# Patient Record
Sex: FEMALE | Race: WHITE | NOT HISPANIC OR LATINO | Employment: OTHER | URBAN - METROPOLITAN AREA
[De-identification: names, ages, dates, MRNs, and addresses within clinical notes are randomized per-mention and may not be internally consistent; named-entity substitution may affect disease eponyms.]

---

## 2018-01-18 ENCOUNTER — RECORDS - HEALTHEAST (OUTPATIENT)
Dept: LAB | Facility: CLINIC | Age: 83
End: 2018-01-18

## 2018-01-18 LAB
ANION GAP SERPL CALCULATED.3IONS-SCNC: 8 MMOL/L (ref 5–18)
BUN SERPL-MCNC: 19 MG/DL (ref 8–28)
CALCIUM SERPL-MCNC: 9 MG/DL (ref 8.5–10.5)
CHLORIDE BLD-SCNC: 100 MMOL/L (ref 98–107)
CO2 SERPL-SCNC: 28 MMOL/L (ref 22–31)
CREAT SERPL-MCNC: 0.65 MG/DL (ref 0.6–1.1)
GFR SERPL CREATININE-BSD FRML MDRD: >60 ML/MIN/1.73M2
GLUCOSE BLD-MCNC: 88 MG/DL (ref 70–125)
POTASSIUM BLD-SCNC: 4 MMOL/L (ref 3.5–5)
SODIUM SERPL-SCNC: 136 MMOL/L (ref 136–145)

## 2018-03-20 ENCOUNTER — RECORDS - HEALTHEAST (OUTPATIENT)
Dept: ADMINISTRATIVE | Facility: OTHER | Age: 83
End: 2018-03-20

## 2018-03-20 ENCOUNTER — HOSPITAL ENCOUNTER (OUTPATIENT)
Dept: CT IMAGING | Facility: HOSPITAL | Age: 83
Discharge: HOME | End: 2018-03-20
Attending: FAMILY MEDICINE

## 2018-03-20 DIAGNOSIS — R51.9 RIGHT SIDED FACIAL PAIN: ICD-10-CM

## 2018-05-24 ENCOUNTER — COMMUNICATION - HEALTHEAST (OUTPATIENT)
Dept: PULMONOLOGY | Facility: OTHER | Age: 83
End: 2018-05-24

## 2018-05-25 ENCOUNTER — COMMUNICATION - HEALTHEAST (OUTPATIENT)
Dept: PULMONOLOGY | Facility: OTHER | Age: 83
End: 2018-05-25

## 2018-06-04 ENCOUNTER — RECORDS - HEALTHEAST (OUTPATIENT)
Dept: ADMINISTRATIVE | Facility: OTHER | Age: 83
End: 2018-06-04

## 2018-08-07 ENCOUNTER — RECORDS - HEALTHEAST (OUTPATIENT)
Dept: LAB | Facility: CLINIC | Age: 83
End: 2018-08-07

## 2018-08-07 LAB
ANION GAP SERPL CALCULATED.3IONS-SCNC: 9 MMOL/L (ref 5–18)
BUN SERPL-MCNC: 14 MG/DL (ref 8–28)
CALCIUM SERPL-MCNC: 8.6 MG/DL (ref 8.5–10.5)
CHLORIDE BLD-SCNC: 98 MMOL/L (ref 98–107)
CO2 SERPL-SCNC: 27 MMOL/L (ref 22–31)
CREAT SERPL-MCNC: 0.77 MG/DL (ref 0.6–1.1)
GFR SERPL CREATININE-BSD FRML MDRD: >60 ML/MIN/1.73M2
GLUCOSE BLD-MCNC: 76 MG/DL (ref 70–125)
POTASSIUM BLD-SCNC: 4.5 MMOL/L (ref 3.5–5)
SODIUM SERPL-SCNC: 134 MMOL/L (ref 136–145)
TSH SERPL DL<=0.005 MIU/L-ACNC: 3.54 UIU/ML (ref 0.3–5)

## 2018-10-03 ENCOUNTER — RECORDS - HEALTHEAST (OUTPATIENT)
Dept: ADMINISTRATIVE | Facility: OTHER | Age: 83
End: 2018-10-03

## 2018-10-09 ENCOUNTER — HOSPITAL ENCOUNTER (OUTPATIENT)
Dept: INTERVENTIONAL RADIOLOGY/VASCULAR | Facility: HOSPITAL | Age: 83
Discharge: HOME | End: 2018-10-09
Attending: INTERNAL MEDICINE | Admitting: RADIOLOGY

## 2018-10-09 DIAGNOSIS — C34.90 NON-SMALL CELL LUNG CANCER (H): ICD-10-CM

## 2018-10-09 LAB
HGB BLD-MCNC: 12.4 G/DL (ref 12–16)
INR PPP: 1.05 (ref 0.9–1.1)
PLATELET # BLD AUTO: 203 THOU/UL (ref 140–440)

## 2018-12-13 ENCOUNTER — RECORDS - HEALTHEAST (OUTPATIENT)
Dept: LAB | Facility: CLINIC | Age: 83
End: 2018-12-13

## 2018-12-13 LAB
ERYTHROCYTE [DISTWIDTH] IN BLOOD BY AUTOMATED COUNT: 18.3 % (ref 11–14.5)
HCT VFR BLD AUTO: 30.3 % (ref 35–47)
HGB BLD-MCNC: 9.9 G/DL (ref 12–16)
MCH RBC QN AUTO: 32.9 PG (ref 27–34)
MCHC RBC AUTO-ENTMCNC: 32.7 G/DL (ref 32–36)
MCV RBC AUTO: 101 FL (ref 80–100)
PLATELET # BLD AUTO: 207 THOU/UL (ref 140–440)
PMV BLD AUTO: 10 FL (ref 8.5–12.5)
RBC # BLD AUTO: 3.01 MILL/UL (ref 3.8–5.4)
WBC: 2.5 THOU/UL (ref 4–11)

## 2019-07-31 ENCOUNTER — RECORDS - HEALTHEAST (OUTPATIENT)
Dept: LAB | Facility: CLINIC | Age: 84
End: 2019-07-31

## 2019-07-31 LAB
ANION GAP SERPL CALCULATED.3IONS-SCNC: 8 MMOL/L (ref 5–18)
BUN SERPL-MCNC: 17 MG/DL (ref 8–28)
CALCIUM SERPL-MCNC: 9.5 MG/DL (ref 8.5–10.5)
CHLORIDE BLD-SCNC: 100 MMOL/L (ref 98–107)
CO2 SERPL-SCNC: 30 MMOL/L (ref 22–31)
CREAT SERPL-MCNC: 0.7 MG/DL (ref 0.6–1.1)
GFR SERPL CREATININE-BSD FRML MDRD: >60 ML/MIN/1.73M2
GLUCOSE BLD-MCNC: 99 MG/DL (ref 70–125)
POTASSIUM BLD-SCNC: 4.3 MMOL/L (ref 3.5–5)
SODIUM SERPL-SCNC: 138 MMOL/L (ref 136–145)
TSH SERPL DL<=0.005 MIU/L-ACNC: 0.91 UIU/ML (ref 0.3–5)

## 2020-02-19 ENCOUNTER — RECORDS - HEALTHEAST (OUTPATIENT)
Dept: LAB | Facility: CLINIC | Age: 85
End: 2020-02-19

## 2020-02-19 LAB
ANION GAP SERPL CALCULATED.3IONS-SCNC: 10 MMOL/L (ref 5–18)
BUN SERPL-MCNC: 19 MG/DL (ref 8–28)
CALCIUM SERPL-MCNC: 10 MG/DL (ref 8.5–10.5)
CHLORIDE BLD-SCNC: 97 MMOL/L (ref 98–107)
CO2 SERPL-SCNC: 30 MMOL/L (ref 22–31)
CREAT SERPL-MCNC: 0.66 MG/DL (ref 0.6–1.1)
GFR SERPL CREATININE-BSD FRML MDRD: >60 ML/MIN/1.73M2
GLUCOSE BLD-MCNC: 63 MG/DL (ref 70–125)
POTASSIUM BLD-SCNC: 4.5 MMOL/L (ref 3.5–5)
SODIUM SERPL-SCNC: 137 MMOL/L (ref 136–145)

## 2020-08-25 ENCOUNTER — RECORDS - HEALTHEAST (OUTPATIENT)
Dept: LAB | Facility: CLINIC | Age: 85
End: 2020-08-25

## 2020-08-25 LAB
ANION GAP SERPL CALCULATED.3IONS-SCNC: 13 MMOL/L (ref 5–18)
BUN SERPL-MCNC: 14 MG/DL (ref 8–28)
CALCIUM SERPL-MCNC: 8.3 MG/DL (ref 8.5–10.5)
CHLORIDE BLD-SCNC: 100 MMOL/L (ref 98–107)
CO2 SERPL-SCNC: 23 MMOL/L (ref 22–31)
CREAT SERPL-MCNC: 0.75 MG/DL (ref 0.6–1.1)
GFR SERPL CREATININE-BSD FRML MDRD: >60 ML/MIN/1.73M2
GLUCOSE BLD-MCNC: 89 MG/DL (ref 70–125)
POTASSIUM BLD-SCNC: 4.3 MMOL/L (ref 3.5–5)
SODIUM SERPL-SCNC: 136 MMOL/L (ref 136–145)
TSH SERPL DL<=0.005 MIU/L-ACNC: 0.87 UIU/ML (ref 0.3–5)

## 2020-09-24 ENCOUNTER — RECORDS - HEALTHEAST (OUTPATIENT)
Dept: LAB | Facility: CLINIC | Age: 85
End: 2020-09-24

## 2020-09-24 LAB
ALBUMIN SERPL-MCNC: 3.6 G/DL (ref 3.5–5)
ALP SERPL-CCNC: 51 U/L (ref 45–120)
ALT SERPL W P-5'-P-CCNC: 21 U/L (ref 0–45)
ANION GAP SERPL CALCULATED.3IONS-SCNC: 9 MMOL/L (ref 5–18)
AST SERPL W P-5'-P-CCNC: 26 U/L (ref 0–40)
BASOPHILS # BLD AUTO: 0 THOU/UL (ref 0–0.2)
BASOPHILS NFR BLD AUTO: 0 % (ref 0–2)
BILIRUB SERPL-MCNC: 0.7 MG/DL (ref 0–1)
BUN SERPL-MCNC: 18 MG/DL (ref 8–28)
CALCIUM SERPL-MCNC: 8.8 MG/DL (ref 8.5–10.5)
CHLORIDE BLD-SCNC: 102 MMOL/L (ref 98–107)
CO2 SERPL-SCNC: 25 MMOL/L (ref 22–31)
CREAT SERPL-MCNC: 0.72 MG/DL (ref 0.6–1.1)
EOSINOPHIL COUNT (ABSOLUTE): 0 THOU/UL (ref 0–0.4)
EOSINOPHIL NFR BLD AUTO: 2 % (ref 0–6)
ERYTHROCYTE [DISTWIDTH] IN BLOOD BY AUTOMATED COUNT: 16.2 % (ref 11–14.5)
GFR SERPL CREATININE-BSD FRML MDRD: >60 ML/MIN/1.73M2
GLUCOSE BLD-MCNC: 81 MG/DL (ref 70–125)
HCT VFR BLD AUTO: 38.1 % (ref 35–47)
HGB BLD-MCNC: 12.5 G/DL (ref 12–16)
IMMATURE GRANULOCYTE % - MAN (DIFF): 0 %
IMMATURE GRANULOCYTE ABSOLUTE - MAN (DIFF): 0 THOU/UL
LYMPHOCYTES # BLD AUTO: 0.5 THOU/UL (ref 0.8–4.4)
LYMPHOCYTES NFR BLD AUTO: 22 % (ref 20–40)
MANUAL NRBC PER 100 CELLS: 1
MCH RBC QN AUTO: 32.5 PG (ref 27–34)
MCHC RBC AUTO-ENTMCNC: 32.8 G/DL (ref 32–36)
MCV RBC AUTO: 99 FL (ref 80–100)
MONOCYTES # BLD AUTO: 0.4 THOU/UL (ref 0–0.9)
MONOCYTES NFR BLD AUTO: 17 % (ref 2–10)
OVALOCYTES: ABNORMAL
PLAT MORPH BLD: NORMAL
PLATELET # BLD AUTO: 181 THOU/UL (ref 140–440)
PMV BLD AUTO: 11.5 FL (ref 8.5–12.5)
POTASSIUM BLD-SCNC: 4.5 MMOL/L (ref 3.5–5)
PROT SERPL-MCNC: 6.9 G/DL (ref 6–8)
RBC # BLD AUTO: 3.85 MILL/UL (ref 3.8–5.4)
SODIUM SERPL-SCNC: 136 MMOL/L (ref 136–145)
TOTAL NEUTROPHILS-ABS(DIFF): 1.4 THOU/UL (ref 2–7.7)
TOTAL NEUTROPHILS-REL(DIFF): 59 % (ref 50–70)
WBC: 2.3 THOU/UL (ref 4–11)

## 2020-12-18 ENCOUNTER — AMBULATORY - HEALTHEAST (OUTPATIENT)
Dept: SCHEDULING | Facility: CLINIC | Age: 85
End: 2020-12-18

## 2020-12-18 DIAGNOSIS — C34.90 NON-SMALL CELL LUNG CANCER (H): ICD-10-CM

## 2020-12-31 ENCOUNTER — AMBULATORY - HEALTHEAST (OUTPATIENT)
Dept: ULTRASOUND IMAGING | Facility: HOSPITAL | Age: 85
End: 2020-12-31

## 2020-12-31 DIAGNOSIS — Z11.59 ENCOUNTER FOR SCREENING FOR OTHER VIRAL DISEASES: ICD-10-CM

## 2021-01-01 ENCOUNTER — HOSPITAL ENCOUNTER (OUTPATIENT)
Dept: RADIOLOGY | Facility: HOSPITAL | Age: 86
End: 2021-11-26
Attending: INTERNAL MEDICINE
Payer: MEDICARE

## 2021-01-01 ENCOUNTER — LAB (OUTPATIENT)
Dept: LAB | Facility: CLINIC | Age: 86
End: 2021-01-01
Payer: MEDICARE

## 2021-01-01 ENCOUNTER — HOSPITAL ENCOUNTER (OUTPATIENT)
Dept: ULTRASOUND IMAGING | Facility: HOSPITAL | Age: 86
End: 2021-11-26
Attending: INTERNAL MEDICINE
Payer: MEDICARE

## 2021-01-01 DIAGNOSIS — C34.90 NON-SMALL CELL LUNG CANCER, UNSPECIFIED LATERALITY (H): ICD-10-CM

## 2021-01-01 DIAGNOSIS — Z11.59 ENCOUNTER FOR SCREENING FOR OTHER VIRAL DISEASES: ICD-10-CM

## 2021-01-01 LAB — SARS-COV-2 RNA RESP QL NAA+PROBE: NEGATIVE

## 2021-01-01 PROCEDURE — U0005 INFEC AGEN DETEC AMPLI PROBE: HCPCS

## 2021-01-01 PROCEDURE — 76604 US EXAM CHEST: CPT

## 2021-01-01 PROCEDURE — 71046 X-RAY EXAM CHEST 2 VIEWS: CPT

## 2021-01-01 PROCEDURE — U0003 INFECTIOUS AGENT DETECTION BY NUCLEIC ACID (DNA OR RNA); SEVERE ACUTE RESPIRATORY SYNDROME CORONAVIRUS 2 (SARS-COV-2) (CORONAVIRUS DISEASE [COVID-19]), AMPLIFIED PROBE TECHNIQUE, MAKING USE OF HIGH THROUGHPUT TECHNOLOGIES AS DESCRIBED BY CMS-2020-01-R: HCPCS

## 2021-01-14 ENCOUNTER — AMBULATORY - HEALTHEAST (OUTPATIENT)
Dept: ULTRASOUND IMAGING | Facility: HOSPITAL | Age: 86
End: 2021-01-14

## 2021-01-14 DIAGNOSIS — Z11.59 ENCOUNTER FOR SCREENING FOR OTHER VIRAL DISEASES: ICD-10-CM

## 2021-03-10 ENCOUNTER — AMBULATORY - HEALTHEAST (OUTPATIENT)
Dept: ULTRASOUND IMAGING | Facility: HOSPITAL | Age: 86
End: 2021-03-10

## 2021-03-10 DIAGNOSIS — Z11.59 ENCOUNTER FOR SCREENING FOR OTHER VIRAL DISEASES: ICD-10-CM

## 2021-03-12 ENCOUNTER — AMBULATORY - HEALTHEAST (OUTPATIENT)
Dept: LAB | Facility: CLINIC | Age: 86
End: 2021-03-12

## 2021-03-12 DIAGNOSIS — Z11.59 ENCOUNTER FOR SCREENING FOR OTHER VIRAL DISEASES: ICD-10-CM

## 2021-03-13 LAB
SARS-COV-2 PCR COMMENT: NORMAL
SARS-COV-2 RNA SPEC QL NAA+PROBE: NEGATIVE
SARS-COV-2 VIRUS SPECIMEN SOURCE: NORMAL

## 2021-03-14 ENCOUNTER — COMMUNICATION - HEALTHEAST (OUTPATIENT)
Dept: SCHEDULING | Facility: CLINIC | Age: 86
End: 2021-03-14

## 2021-03-16 ENCOUNTER — HOSPITAL ENCOUNTER (OUTPATIENT)
Dept: ULTRASOUND IMAGING | Facility: HOSPITAL | Age: 86
Discharge: HOME | End: 2021-03-16
Attending: INTERNAL MEDICINE | Admitting: RADIOLOGY

## 2021-03-16 DIAGNOSIS — C34.90 NON-SMALL CELL LUNG CANCER (H): ICD-10-CM

## 2021-04-08 ENCOUNTER — AMBULATORY - HEALTHEAST (OUTPATIENT)
Dept: FAMILY MEDICINE | Facility: CLINIC | Age: 86
End: 2021-04-08

## 2021-04-08 DIAGNOSIS — Z11.59 ENCOUNTER FOR SCREENING FOR OTHER VIRAL DISEASES: ICD-10-CM

## 2021-04-10 ENCOUNTER — COMMUNICATION - HEALTHEAST (OUTPATIENT)
Dept: SCHEDULING | Facility: CLINIC | Age: 86
End: 2021-04-10

## 2021-04-19 ENCOUNTER — AMBULATORY - HEALTHEAST (OUTPATIENT)
Dept: INTERVENTIONAL RADIOLOGY/VASCULAR | Facility: HOSPITAL | Age: 86
End: 2021-04-19

## 2021-04-19 DIAGNOSIS — Z11.59 ENCOUNTER FOR SCREENING FOR OTHER VIRAL DISEASES: ICD-10-CM

## 2021-04-23 ENCOUNTER — AMBULATORY - HEALTHEAST (OUTPATIENT)
Dept: LAB | Facility: CLINIC | Age: 86
End: 2021-04-23

## 2021-04-23 DIAGNOSIS — Z11.59 ENCOUNTER FOR SCREENING FOR OTHER VIRAL DISEASES: ICD-10-CM

## 2021-04-25 ENCOUNTER — COMMUNICATION - HEALTHEAST (OUTPATIENT)
Dept: SCHEDULING | Facility: CLINIC | Age: 86
End: 2021-04-25

## 2021-04-27 ENCOUNTER — HOSPITAL ENCOUNTER (OUTPATIENT)
Dept: RADIOLOGY | Facility: HOSPITAL | Age: 86
Discharge: HOME | End: 2021-04-27
Attending: RADIOLOGY

## 2021-04-27 ENCOUNTER — HOSPITAL ENCOUNTER (OUTPATIENT)
Dept: ULTRASOUND IMAGING | Facility: HOSPITAL | Age: 86
Discharge: HOME | End: 2021-04-27
Attending: INTERNAL MEDICINE

## 2021-04-27 DIAGNOSIS — C34.90 NON-SMALL CELL LUNG CANCER (H): ICD-10-CM

## 2021-05-24 ENCOUNTER — RECORDS - HEALTHEAST (OUTPATIENT)
Dept: ADMINISTRATIVE | Facility: CLINIC | Age: 86
End: 2021-05-24

## 2021-05-25 ENCOUNTER — RECORDS - HEALTHEAST (OUTPATIENT)
Dept: ADMINISTRATIVE | Facility: CLINIC | Age: 86
End: 2021-05-25

## 2021-05-26 ENCOUNTER — RECORDS - HEALTHEAST (OUTPATIENT)
Dept: ADMINISTRATIVE | Facility: CLINIC | Age: 86
End: 2021-05-26

## 2021-05-27 ENCOUNTER — RECORDS - HEALTHEAST (OUTPATIENT)
Dept: ADMINISTRATIVE | Facility: CLINIC | Age: 86
End: 2021-05-27

## 2021-05-28 ENCOUNTER — RECORDS - HEALTHEAST (OUTPATIENT)
Dept: ADMINISTRATIVE | Facility: CLINIC | Age: 86
End: 2021-05-28

## 2021-06-02 VITALS — WEIGHT: 109 LBS | BODY MASS INDEX: 19.94 KG/M2

## 2021-06-11 ENCOUNTER — AMBULATORY - HEALTHEAST (OUTPATIENT)
Dept: ULTRASOUND IMAGING | Facility: HOSPITAL | Age: 86
End: 2021-06-11

## 2021-06-11 DIAGNOSIS — Z11.59 ENCOUNTER FOR SCREENING FOR OTHER VIRAL DISEASES: ICD-10-CM

## 2021-06-14 ENCOUNTER — AMBULATORY - HEALTHEAST (OUTPATIENT)
Dept: LAB | Facility: CLINIC | Age: 86
End: 2021-06-14

## 2021-06-14 DIAGNOSIS — Z11.59 ENCOUNTER FOR SCREENING FOR OTHER VIRAL DISEASES: ICD-10-CM

## 2021-06-16 ENCOUNTER — RECORDS - HEALTHEAST (OUTPATIENT)
Dept: SCHEDULING | Facility: CLINIC | Age: 86
End: 2021-06-16

## 2021-06-16 ENCOUNTER — HOSPITAL ENCOUNTER (OUTPATIENT)
Dept: ULTRASOUND IMAGING | Facility: HOSPITAL | Age: 86
Discharge: HOME | End: 2021-06-16
Attending: INTERNAL MEDICINE
Payer: MEDICARE

## 2021-06-16 ENCOUNTER — COMMUNICATION - HEALTHEAST (OUTPATIENT)
Dept: SCHEDULING | Facility: CLINIC | Age: 86
End: 2021-06-16

## 2021-06-16 DIAGNOSIS — C34.90 NON-SMALL CELL LUNG CANCER (H): ICD-10-CM

## 2021-06-16 PROBLEM — D75.89 BICYTOPENIA: Status: ACTIVE | Noted: 2021-04-08

## 2021-06-16 PROBLEM — D70.1 CHEMOTHERAPY-INDUCED NEUTROPENIA (H): Status: ACTIVE | Noted: 2018-11-30

## 2021-06-16 PROBLEM — R91.8 MULTIPLE PULMONARY NODULES: Status: ACTIVE | Noted: 2018-05-31

## 2021-06-16 PROBLEM — G47.00 INSOMNIA: Status: ACTIVE | Noted: 2021-01-12

## 2021-06-16 PROBLEM — E03.9 HYPOTHYROIDISM: Status: ACTIVE | Noted: 2021-01-12

## 2021-06-16 PROBLEM — J30.1 ALLERGIC RHINITIS DUE TO POLLEN: Status: ACTIVE | Noted: 2021-01-12

## 2021-06-16 PROBLEM — R79.89 ELEVATED BRAIN NATRIURETIC PEPTIDE (BNP) LEVEL: Status: ACTIVE | Noted: 2021-04-08

## 2021-06-16 PROBLEM — R55 SYNCOPE AND COLLAPSE: Status: ACTIVE | Noted: 2018-05-22

## 2021-06-16 PROBLEM — I11.9 HYPERTENSIVE HEART DISEASE WITHOUT HEART FAILURE: Status: ACTIVE | Noted: 2018-05-31

## 2021-06-16 PROBLEM — R53.1 WEAKNESS: Status: ACTIVE | Noted: 2021-04-08

## 2021-06-16 PROBLEM — J96.01 ACUTE RESPIRATORY FAILURE WITH HYPOXIA (H): Status: ACTIVE | Noted: 2021-04-08

## 2021-06-16 PROBLEM — D63.0 ANEMIA IN NEOPLASTIC DISEASE: Status: ACTIVE | Noted: 2018-11-30

## 2021-06-16 PROBLEM — M81.0 OSTEOPOROSIS: Status: ACTIVE | Noted: 2018-05-31

## 2021-06-16 PROBLEM — R32 URINARY INCONTINENCE: Status: ACTIVE | Noted: 2021-01-12

## 2021-06-16 PROBLEM — J31.0 CHRONIC RHINITIS: Status: ACTIVE | Noted: 2021-01-12

## 2021-06-16 PROBLEM — R40.20 LOSS OF CONSCIOUSNESS (H): Status: ACTIVE | Noted: 2018-05-22

## 2021-06-16 PROBLEM — J90 PLEURAL EFFUSION: Status: ACTIVE | Noted: 2021-04-08

## 2021-06-16 PROBLEM — T45.1X5A CHEMOTHERAPY-INDUCED NEUTROPENIA (H): Status: ACTIVE | Noted: 2018-11-30

## 2021-06-16 PROBLEM — Z86.79 HISTORY OF CARDIOVASCULAR DISORDER: Status: ACTIVE | Noted: 2018-05-31

## 2021-06-16 PROBLEM — R60.0 LOWER EXTREMITY EDEMA: Status: ACTIVE | Noted: 2021-04-08

## 2021-06-16 PROBLEM — F43.29 ADJUSTMENT DISORDER WITH MIXED EMOTIONAL FEATURES: Status: ACTIVE | Noted: 2021-01-12

## 2021-06-16 PROBLEM — J32.9 CHRONIC SINUSITIS: Status: ACTIVE | Noted: 2018-05-31

## 2021-06-20 NOTE — H&P
[facility] Radiology History and Physical Note  Date/Time: 10/9/2018/7:53 AM    Procedure Requested: Port placement  Requesting Provider: Dr. Farias    HPI: Jennifer Rashid is a 86 y.o. female with history of HTN, thyroid cancer s/p partial thyroidectomy and non small cell lung cancer (RUL) s/p chemo x 12 weeks with good response. Plan is to continue concurrent chemotherapy and radiation. Patient presents today for port placement.     Discussed LEFT port placement d/t plan for RUL radiation.     NPO Status: [place]  Anticoagulation/Antiplatelets/Bleeding tendencies: Aspirin  Antibiotics: Clindamycin for procedure    REVIEW OF SYMPTOMS: Denies recent fevers, chills, night sweats, abdominal pain, N/V/D.    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       aspirin 81 MG EC tablet Take 81 mg by mouth daily.       azelastine (ASTELIN) 137 mcg (0.1 %) nasal spray Apply 2 sprays into each nostril every morning. Use in each nostril as directed       calcium-vitamin D 500 mg(1,250mg) -200 unit per tablet Take 1 tablet by mouth 2 (two) times a day with meals.       cholecalciferol, vitamin D3, (VITAMIN D3) 2,000 unit Tab Take 1 tablet by mouth daily.       cyanocobalamin (VITAMIN B-12) 500 MCG tablet Take 500 mcg by mouth daily.       denosumab 60 mg/mL Syrg Inject 60 mg under the skin every 6 (six) months.       levothyroxine (SYNTHROID, LEVOTHROID) 75 MCG tablet Take 75 mcg by mouth daily.       lisinopril (PRINIVIL,ZESTRIL) 20 MG tablet Take 20 mg by mouth daily.        metoprolol succinate (TOPROL-XL) 100 MG 24 hr tablet Take 100 mg by mouth daily.       multivitamin therapeutic tablet Take 1 tablet by mouth daily.       Current Facility-Administered Medications   Medication Dose Route Frequency Provider Last Rate Last Dose     clindamycin in  mg/50 mL IVPB 900 mg (CLEOCIN)  900 mg Intravenous 30 Min Pre-Op Sandra Marrero CNP         sodium chloride 0.9%  125 mL/hr Intravenous Continuous Sandra Marrero CNP         sodium chloride bacteriostatic 0.9 % injection 0.1-0.3 mL  0.1-0.3 mL Subcutaneous PRN Sandra Marrero CNP         sodium chloride flush 3 mL (NS)  3 mL Intravenous Line Care Sandra Marrero CNP           LABS:  INR (no units)   Date Value   10/09/2018 1.05     Hemoglobin (g/dL)   Date Value   10/09/2018 12.4     Platelets (thou/uL)   Date Value   10/09/2018 203     EXAM:  /75  Pulse 67  Temp 97.7  F (36.5  C) (Oral)   Resp 15  Wt 109 lb (49.4 kg)  SpO2 100%  BMI 19.94 kg/m2  General: Stable. In no acute distress.  Neuro: A&O x 3. y.  Resp: Lungs clear to auscultation bilaterally.  Cardio: S1S2 and reg, without murmur, clicks or rubs  Skin: Without excoriations, ecchymosis, erythema, lesions or open sores on bilateral chest/neck.    Pre-Sedation Assessment:  Mallampati Airway Classification: Class 2: upper half of tonsil fossa visible  Previous reaction to anesthesia/sedation: no  Sedation plan based on assessment: Moderate  Sleep Apnea: no  Dentures: no  COPD: no  ASA Classification: ASA 3 - Patient with moderate systemic disease with functional limitations    ASSESSMENT: 86 year old female with non small lung cancer (RUL) in need of long term IV access for chemotherapy     PLAN: LEFT port placement with sedation     The procedure, risks and moderate sedation were discussed with the patient, all questions answered and patient agrees to proceed with the procedure. Written consent obtained.    Sandra Marrero CNP    [facility]: Interventional  Radiology   [phone]

## 2021-06-20 NOTE — H&P
H&P documented within 30 days. I have performed and assessment and examined the patient, as necessary, to update the patient's current status that may have changed since the prior History and Physical.       Whitney Gross MD, [facility]  Vascular and Interventional Radiology  Pager: [phone]  Clinic: [phone]

## 2021-06-27 ENCOUNTER — HEALTH MAINTENANCE LETTER (OUTPATIENT)
Age: 86
End: 2021-06-27

## 2021-07-13 ENCOUNTER — RECORDS - HEALTHEAST (OUTPATIENT)
Dept: ADMINISTRATIVE | Facility: CLINIC | Age: 86
End: 2021-07-13

## 2021-07-21 ENCOUNTER — RECORDS - HEALTHEAST (OUTPATIENT)
Dept: ADMINISTRATIVE | Facility: CLINIC | Age: 86
End: 2021-07-21

## 2021-09-24 ENCOUNTER — LAB REQUISITION (OUTPATIENT)
Dept: LAB | Facility: CLINIC | Age: 86
End: 2021-09-24
Payer: MEDICARE

## 2021-09-24 DIAGNOSIS — I10 ESSENTIAL (PRIMARY) HYPERTENSION: ICD-10-CM

## 2021-09-24 LAB
ANION GAP SERPL CALCULATED.3IONS-SCNC: 11 MMOL/L (ref 5–18)
BUN SERPL-MCNC: 15 MG/DL (ref 8–28)
CALCIUM SERPL-MCNC: 8.7 MG/DL (ref 8.5–10.5)
CHLORIDE BLD-SCNC: 102 MMOL/L (ref 98–107)
CO2 SERPL-SCNC: 28 MMOL/L (ref 22–31)
CREAT SERPL-MCNC: 0.67 MG/DL (ref 0.6–1.1)
GFR SERPL CREATININE-BSD FRML MDRD: 78 ML/MIN/1.73M2
GLUCOSE BLD-MCNC: 66 MG/DL (ref 70–125)
POTASSIUM BLD-SCNC: 3.9 MMOL/L (ref 3.5–5)
SODIUM SERPL-SCNC: 141 MMOL/L (ref 136–145)

## 2021-09-24 PROCEDURE — 36415 COLL VENOUS BLD VENIPUNCTURE: CPT | Mod: ORL | Performed by: FAMILY MEDICINE

## 2021-09-24 PROCEDURE — 84443 ASSAY THYROID STIM HORMONE: CPT | Mod: ORL | Performed by: FAMILY MEDICINE

## 2021-09-24 PROCEDURE — 80048 BASIC METABOLIC PNL TOTAL CA: CPT | Mod: ORL | Performed by: FAMILY MEDICINE

## 2021-09-27 ENCOUNTER — LAB REQUISITION (OUTPATIENT)
Dept: LAB | Facility: CLINIC | Age: 86
End: 2021-09-27
Payer: MEDICARE

## 2021-09-27 DIAGNOSIS — E03.9 HYPOTHYROIDISM, UNSPECIFIED: ICD-10-CM

## 2021-09-28 LAB — TSH SERPL DL<=0.005 MIU/L-ACNC: 6.47 UIU/ML (ref 0.3–5)

## 2021-10-17 ENCOUNTER — HEALTH MAINTENANCE LETTER (OUTPATIENT)
Age: 86
End: 2021-10-17

## 2022-01-01 ENCOUNTER — APPOINTMENT (OUTPATIENT)
Dept: PHYSICAL THERAPY | Facility: HOSPITAL | Age: 87
DRG: 180 | End: 2022-01-01
Attending: INTERNAL MEDICINE
Payer: MEDICARE

## 2022-01-01 ENCOUNTER — HOSPITAL ENCOUNTER (INPATIENT)
Facility: HOSPITAL | Age: 87
LOS: 2 days | End: 2022-11-09
Attending: INTERNAL MEDICINE | Admitting: INTERNAL MEDICINE
Payer: OTHER MISCELLANEOUS

## 2022-01-01 ENCOUNTER — DOCUMENTATION ONLY (OUTPATIENT)
Dept: OTHER | Facility: CLINIC | Age: 87
End: 2022-01-01

## 2022-01-01 ENCOUNTER — APPOINTMENT (OUTPATIENT)
Dept: ULTRASOUND IMAGING | Facility: HOSPITAL | Age: 87
DRG: 180 | End: 2022-01-01
Attending: FAMILY MEDICINE
Payer: MEDICARE

## 2022-01-01 ENCOUNTER — APPOINTMENT (OUTPATIENT)
Dept: CARDIOLOGY | Facility: HOSPITAL | Age: 87
DRG: 180 | End: 2022-01-01
Attending: PHYSICIAN ASSISTANT
Payer: MEDICARE

## 2022-01-01 ENCOUNTER — APPOINTMENT (OUTPATIENT)
Dept: CT IMAGING | Facility: HOSPITAL | Age: 87
DRG: 180 | End: 2022-01-01
Attending: EMERGENCY MEDICINE
Payer: MEDICARE

## 2022-01-01 ENCOUNTER — MEDICAL CORRESPONDENCE (OUTPATIENT)
Dept: HEALTH INFORMATION MANAGEMENT | Facility: CLINIC | Age: 87
End: 2022-01-01

## 2022-01-01 ENCOUNTER — HEALTH MAINTENANCE LETTER (OUTPATIENT)
Age: 87
End: 2022-01-01

## 2022-01-01 ENCOUNTER — LAB REQUISITION (OUTPATIENT)
Dept: LAB | Facility: CLINIC | Age: 87
End: 2022-01-01
Payer: MEDICARE

## 2022-01-01 ENCOUNTER — APPOINTMENT (OUTPATIENT)
Dept: OCCUPATIONAL THERAPY | Facility: HOSPITAL | Age: 87
DRG: 180 | End: 2022-01-01
Attending: INTERNAL MEDICINE
Payer: MEDICARE

## 2022-01-01 ENCOUNTER — APPOINTMENT (OUTPATIENT)
Dept: PHYSICAL THERAPY | Facility: HOSPITAL | Age: 87
DRG: 180 | End: 2022-01-01
Payer: MEDICARE

## 2022-01-01 ENCOUNTER — APPOINTMENT (OUTPATIENT)
Dept: OCCUPATIONAL THERAPY | Facility: HOSPITAL | Age: 87
DRG: 180 | End: 2022-01-01
Payer: MEDICARE

## 2022-01-01 ENCOUNTER — APPOINTMENT (OUTPATIENT)
Dept: ULTRASOUND IMAGING | Facility: HOSPITAL | Age: 87
DRG: 180 | End: 2022-01-01
Attending: INTERNAL MEDICINE
Payer: MEDICARE

## 2022-01-01 ENCOUNTER — TRANSFERRED RECORDS (OUTPATIENT)
Dept: HEALTH INFORMATION MANAGEMENT | Facility: CLINIC | Age: 87
End: 2022-01-01

## 2022-01-01 ENCOUNTER — HOSPITAL ENCOUNTER (INPATIENT)
Facility: HOSPITAL | Age: 87
LOS: 6 days | Discharge: HOSPICE | DRG: 180 | End: 2022-11-07
Attending: EMERGENCY MEDICINE | Admitting: INTERNAL MEDICINE
Payer: MEDICARE

## 2022-01-01 ENCOUNTER — APPOINTMENT (OUTPATIENT)
Dept: RADIOLOGY | Facility: HOSPITAL | Age: 87
DRG: 180 | End: 2022-01-01
Attending: INTERNAL MEDICINE
Payer: MEDICARE

## 2022-01-01 ENCOUNTER — APPOINTMENT (OUTPATIENT)
Dept: CT IMAGING | Facility: HOSPITAL | Age: 87
DRG: 180 | End: 2022-01-01
Attending: INTERNAL MEDICINE
Payer: MEDICARE

## 2022-01-01 VITALS
BODY MASS INDEX: 14.48 KG/M2 | HEART RATE: 100 BPM | SYSTOLIC BLOOD PRESSURE: 124 MMHG | DIASTOLIC BLOOD PRESSURE: 61 MMHG | RESPIRATION RATE: 22 BRPM | OXYGEN SATURATION: 90 % | HEIGHT: 64 IN | TEMPERATURE: 97.3 F | WEIGHT: 84.8 LBS

## 2022-01-01 VITALS
OXYGEN SATURATION: 80 % | DIASTOLIC BLOOD PRESSURE: 65 MMHG | SYSTOLIC BLOOD PRESSURE: 131 MMHG | HEART RATE: 103 BPM | TEMPERATURE: 97 F | RESPIRATION RATE: 11 BRPM

## 2022-01-01 DIAGNOSIS — R09.02 HYPOXIA: ICD-10-CM

## 2022-01-01 DIAGNOSIS — I10 ESSENTIAL (PRIMARY) HYPERTENSION: ICD-10-CM

## 2022-01-01 DIAGNOSIS — J90 PLEURAL EFFUSION: ICD-10-CM

## 2022-01-01 DIAGNOSIS — E03.9 HYPOTHYROIDISM, UNSPECIFIED: ICD-10-CM

## 2022-01-01 LAB
ALBUMIN SERPL BCG-MCNC: 2.7 G/DL (ref 3.5–5.2)
ALBUMIN SERPL BCG-MCNC: 2.7 G/DL (ref 3.5–5.2)
ALBUMIN SERPL BCG-MCNC: 3 G/DL (ref 3.5–5.2)
ALP SERPL-CCNC: 120 U/L (ref 35–104)
ALT SERPL W P-5'-P-CCNC: 10 U/L (ref 10–35)
ANION GAP SERPL CALCULATED.3IONS-SCNC: 10 MMOL/L (ref 7–15)
ANION GAP SERPL CALCULATED.3IONS-SCNC: 13 MMOL/L (ref 7–15)
ANION GAP SERPL CALCULATED.3IONS-SCNC: 3 MMOL/L (ref 7–15)
ANION GAP SERPL CALCULATED.3IONS-SCNC: 4 MMOL/L (ref 7–15)
ANION GAP SERPL CALCULATED.3IONS-SCNC: 9 MMOL/L (ref 7–15)
APPEARANCE FLD: ABNORMAL
APTT PPP: 32 SECONDS (ref 22–38)
AST SERPL W P-5'-P-CCNC: 27 U/L (ref 10–35)
ATRIAL RATE - MUSE: 133 BPM
BACTERIA PLR CULT: NO GROWTH
BASOPHILS # BLD MANUAL: 0 10E3/UL (ref 0–0.2)
BASOPHILS # BLD MANUAL: 0 10E3/UL (ref 0–0.2)
BASOPHILS NFR BLD MANUAL: 0 %
BASOPHILS NFR BLD MANUAL: 0 %
BILIRUB DIRECT SERPL-MCNC: <0.2 MG/DL (ref 0–0.3)
BILIRUB SERPL-MCNC: 0.4 MG/DL
BUN SERPL-MCNC: 11.8 MG/DL (ref 8–23)
BUN SERPL-MCNC: 12.6 MG/DL (ref 8–23)
BUN SERPL-MCNC: 17.1 MG/DL (ref 8–23)
BUN SERPL-MCNC: 17.6 MG/DL (ref 8–23)
BUN SERPL-MCNC: 18.9 MG/DL (ref 8–23)
CALCIUM SERPL-MCNC: 7.6 MG/DL (ref 8.2–9.6)
CALCIUM SERPL-MCNC: 7.8 MG/DL (ref 8.2–9.6)
CALCIUM SERPL-MCNC: 8.2 MG/DL (ref 8.2–9.6)
CALCIUM SERPL-MCNC: 8.8 MG/DL (ref 8.2–9.6)
CALCIUM SERPL-MCNC: 8.9 MG/DL (ref 8.2–9.6)
CELL COUNT BODY FLUID SOURCE: ABNORMAL
CHLORIDE SERPL-SCNC: 101 MMOL/L (ref 98–107)
CHLORIDE SERPL-SCNC: 102 MMOL/L (ref 98–107)
CHLORIDE SERPL-SCNC: 102 MMOL/L (ref 98–107)
COLOR FLD: ABNORMAL
CREAT SERPL-MCNC: 0.5 MG/DL (ref 0.51–0.95)
CREAT SERPL-MCNC: 0.51 MG/DL (ref 0.51–0.95)
CREAT SERPL-MCNC: 0.64 MG/DL (ref 0.51–0.95)
CREAT SERPL-MCNC: 0.66 MG/DL (ref 0.51–0.95)
CREAT SERPL-MCNC: 0.66 MG/DL (ref 0.51–0.95)
DEPRECATED HCO3 PLAS-SCNC: 27 MMOL/L (ref 22–29)
DEPRECATED HCO3 PLAS-SCNC: 28 MMOL/L (ref 22–29)
DEPRECATED HCO3 PLAS-SCNC: 32 MMOL/L (ref 22–29)
DEPRECATED HCO3 PLAS-SCNC: 34 MMOL/L (ref 22–29)
DEPRECATED HCO3 PLAS-SCNC: 35 MMOL/L (ref 22–29)
DIASTOLIC BLOOD PRESSURE - MUSE: NORMAL MMHG
EOSINOPHIL # BLD MANUAL: 0 10E3/UL (ref 0–0.7)
EOSINOPHIL # BLD MANUAL: 0.2 10E3/UL (ref 0–0.7)
EOSINOPHIL NFR BLD MANUAL: 0 %
EOSINOPHIL NFR BLD MANUAL: 1 %
EOSINOPHIL NFR FLD MANUAL: 1 %
ERYTHROCYTE [DISTWIDTH] IN BLOOD BY AUTOMATED COUNT: 18.5 % (ref 10–15)
ERYTHROCYTE [DISTWIDTH] IN BLOOD BY AUTOMATED COUNT: 18.5 % (ref 10–15)
ERYTHROCYTE [DISTWIDTH] IN BLOOD BY AUTOMATED COUNT: 18.8 % (ref 10–15)
ERYTHROCYTE [DISTWIDTH] IN BLOOD BY AUTOMATED COUNT: 18.9 % (ref 10–15)
ERYTHROCYTE [DISTWIDTH] IN BLOOD BY AUTOMATED COUNT: 19.1 % (ref 10–15)
ERYTHROCYTE [DISTWIDTH] IN BLOOD BY AUTOMATED COUNT: 19.1 % (ref 10–15)
FLUAV RNA SPEC QL NAA+PROBE: NEGATIVE
FLUBV RNA RESP QL NAA+PROBE: NEGATIVE
FOLATE SERPL-MCNC: 33.2 NG/ML (ref 4.6–34.8)
GFR SERPL CREATININE-BSD FRML MDRD: 83 ML/MIN/1.73M2
GFR SERPL CREATININE-BSD FRML MDRD: 88 ML/MIN/1.73M2
GFR SERPL CREATININE-BSD FRML MDRD: 89 ML/MIN/1.73M2
GLUCOSE BLDC GLUCOMTR-MCNC: 124 MG/DL (ref 70–99)
GLUCOSE BLDC GLUCOMTR-MCNC: 172 MG/DL (ref 70–99)
GLUCOSE BLDC GLUCOMTR-MCNC: 69 MG/DL (ref 70–99)
GLUCOSE BLDC GLUCOMTR-MCNC: 73 MG/DL (ref 70–99)
GLUCOSE BLDC GLUCOMTR-MCNC: 85 MG/DL (ref 70–99)
GLUCOSE BODY FLUID SOURCE: NORMAL
GLUCOSE FLD-MCNC: 91 MG/DL
GLUCOSE SERPL-MCNC: 104 MG/DL (ref 70–99)
GLUCOSE SERPL-MCNC: 111 MG/DL (ref 70–99)
GLUCOSE SERPL-MCNC: 80 MG/DL (ref 70–99)
GLUCOSE SERPL-MCNC: 81 MG/DL (ref 70–99)
GLUCOSE SERPL-MCNC: 90 MG/DL (ref 70–99)
GRAM STAIN RESULT: NORMAL
GRAM STAIN RESULT: NORMAL
HCT VFR BLD AUTO: 30.4 % (ref 35–47)
HCT VFR BLD AUTO: 32.9 % (ref 35–47)
HCT VFR BLD AUTO: 37.2 % (ref 35–47)
HCT VFR BLD AUTO: 38 % (ref 35–47)
HCT VFR BLD AUTO: 38 % (ref 35–47)
HCT VFR BLD AUTO: 38.6 % (ref 35–47)
HGB BLD-MCNC: 10.3 G/DL (ref 11.7–15.7)
HGB BLD-MCNC: 11.2 G/DL (ref 11.7–15.7)
HGB BLD-MCNC: 11.8 G/DL (ref 11.7–15.7)
HGB BLD-MCNC: 11.8 G/DL (ref 11.7–15.7)
HGB BLD-MCNC: 11.9 G/DL (ref 11.7–15.7)
HGB BLD-MCNC: 9.4 G/DL (ref 11.7–15.7)
HGB BLD-MCNC: 9.8 G/DL (ref 11.7–15.7)
INR PPP: 1.42 (ref 0.85–1.15)
INTERPRETATION ECG - MUSE: NORMAL
LACTATE SERPL-SCNC: 1.8 MMOL/L (ref 0.7–2)
LACTATE SERPL-SCNC: 2.1 MMOL/L (ref 0.7–2)
LACTATE SERPL-SCNC: 2.4 MMOL/L (ref 0.7–2)
LD BODY BODY FLUID SOURCE: NORMAL
LDH FLD L TO P-CCNC: 169 U/L
LDH SERPL L TO P-CCNC: 356 U/L (ref 0–250)
LDH SERPL L TO P-CCNC: 380 U/L (ref 0–250)
LVEF ECHO: NORMAL
LYMPHOCYTES # BLD MANUAL: 0.2 10E3/UL (ref 0.8–5.3)
LYMPHOCYTES # BLD MANUAL: 0.3 10E3/UL (ref 0.8–5.3)
LYMPHOCYTES NFR BLD MANUAL: 1 %
LYMPHOCYTES NFR BLD MANUAL: 2 %
LYMPHOCYTES NFR FLD MANUAL: 77 %
MAGNESIUM SERPL-MCNC: 1.7 MG/DL (ref 1.7–2.3)
MAGNESIUM SERPL-MCNC: 1.9 MG/DL (ref 1.7–2.3)
MAGNESIUM SERPL-MCNC: 2.1 MG/DL (ref 1.7–2.3)
MAGNESIUM SERPL-MCNC: 2.5 MG/DL (ref 1.7–2.3)
MCH RBC QN AUTO: 34.8 PG (ref 26.5–33)
MCH RBC QN AUTO: 35.1 PG (ref 26.5–33)
MCH RBC QN AUTO: 35.5 PG (ref 26.5–33)
MCH RBC QN AUTO: 35.5 PG (ref 26.5–33)
MCH RBC QN AUTO: 35.8 PG (ref 26.5–33)
MCH RBC QN AUTO: 35.8 PG (ref 26.5–33)
MCHC RBC AUTO-ENTMCNC: 29.8 G/DL (ref 31.5–36.5)
MCHC RBC AUTO-ENTMCNC: 30.1 G/DL (ref 31.5–36.5)
MCHC RBC AUTO-ENTMCNC: 30.8 G/DL (ref 31.5–36.5)
MCHC RBC AUTO-ENTMCNC: 30.9 G/DL (ref 31.5–36.5)
MCHC RBC AUTO-ENTMCNC: 31.1 G/DL (ref 31.5–36.5)
MCHC RBC AUTO-ENTMCNC: 31.1 G/DL (ref 31.5–36.5)
MCV RBC AUTO: 114 FL (ref 78–100)
MCV RBC AUTO: 115 FL (ref 78–100)
MCV RBC AUTO: 116 FL (ref 78–100)
MCV RBC AUTO: 119 FL (ref 78–100)
METAMYELOCYTES # BLD MANUAL: 0.1 10E3/UL
METAMYELOCYTES NFR BLD MANUAL: 1 %
MONOCYTES # BLD MANUAL: 0.4 10E3/UL (ref 0–1.3)
MONOCYTES # BLD MANUAL: 1.4 10E3/UL (ref 0–1.3)
MONOCYTES NFR BLD MANUAL: 10 %
MONOCYTES NFR BLD MANUAL: 2 %
MONOS+MACROS NFR FLD MANUAL: 6 %
MYELOCYTES # BLD MANUAL: 0.1 10E3/UL
MYELOCYTES NFR BLD MANUAL: 1 %
NEUTROPHILS # BLD MANUAL: 12.2 10E3/UL (ref 1.6–8.3)
NEUTROPHILS # BLD MANUAL: 18.9 10E3/UL (ref 1.6–8.3)
NEUTROPHILS NFR BLD MANUAL: 86 %
NEUTROPHILS NFR BLD MANUAL: 96 %
NEUTS BAND NFR FLD MANUAL: 16 %
NRBC # BLD AUTO: 0.1 10E3/UL
NRBC BLD MANUAL-RTO: 1 %
NT-PROBNP SERPL-MCNC: 3214 PG/ML (ref 0–1800)
P AXIS - MUSE: NORMAL DEGREES
PATH REPORT.COMMENTS IMP SPEC: ABNORMAL
PATH REPORT.COMMENTS IMP SPEC: YES
PATH REPORT.FINAL DX SPEC: ABNORMAL
PATH REPORT.GROSS SPEC: ABNORMAL
PATH REPORT.MICROSCOPIC SPEC OTHER STN: ABNORMAL
PATH REPORT.RELEVANT HX SPEC: ABNORMAL
PHOSPHATE SERPL-MCNC: 2.2 MG/DL (ref 2.5–4.5)
PHOSPHATE SERPL-MCNC: 2.8 MG/DL (ref 2.5–4.5)
PLAT MORPH BLD: ABNORMAL
PLAT MORPH BLD: ABNORMAL
PLATELET # BLD AUTO: 100 10E3/UL (ref 150–450)
PLATELET # BLD AUTO: 124 10E3/UL (ref 150–450)
PLATELET # BLD AUTO: 124 10E3/UL (ref 150–450)
PLATELET # BLD AUTO: 147 10E3/UL (ref 150–450)
PLATELET # BLD AUTO: 69 10E3/UL (ref 150–450)
PLATELET # BLD AUTO: 78 10E3/UL (ref 150–450)
PLATELET # BLD AUTO: 91 10E3/UL (ref 150–450)
POLYCHROMASIA BLD QL SMEAR: SLIGHT
POTASSIUM SERPL-SCNC: 4.1 MMOL/L (ref 3.4–5.3)
POTASSIUM SERPL-SCNC: 4.5 MMOL/L (ref 3.4–5.3)
POTASSIUM SERPL-SCNC: 4.7 MMOL/L (ref 3.4–5.3)
POTASSIUM SERPL-SCNC: 4.8 MMOL/L (ref 3.4–5.3)
POTASSIUM SERPL-SCNC: 4.8 MMOL/L (ref 3.4–5.3)
POTASSIUM SERPL-SCNC: 5.6 MMOL/L (ref 3.4–5.3)
PR INTERVAL - MUSE: NORMAL MS
PROCALCITONIN SERPL IA-MCNC: 0.14 NG/ML
PROT FLD-MCNC: 4 G/DL
PROT SERPL-MCNC: 6.4 G/DL (ref 6.4–8.3)
PROT SERPL-MCNC: 6.6 G/DL (ref 6.4–8.3)
PROTEIN BODY FLUID SOURCE: NORMAL
QRS DURATION - MUSE: 80 MS
QT - MUSE: 302 MS
QTC - MUSE: 474 MS
R AXIS - MUSE: 67 DEGREES
RBC # BLD AUTO: 2.65 10E6/UL (ref 3.8–5.2)
RBC # BLD AUTO: 2.76 10E6/UL (ref 3.8–5.2)
RBC # BLD AUTO: 3.22 10E6/UL (ref 3.8–5.2)
RBC # BLD AUTO: 3.3 10E6/UL (ref 3.8–5.2)
RBC # BLD AUTO: 3.3 10E6/UL (ref 3.8–5.2)
RBC # BLD AUTO: 3.39 10E6/UL (ref 3.8–5.2)
RBC # FLD: ABNORMAL /UL
RBC MORPH BLD: ABNORMAL
RBC MORPH BLD: ABNORMAL
RSV RNA SPEC NAA+PROBE: NEGATIVE
SARS-COV-2 RNA RESP QL NAA+PROBE: NEGATIVE
SODIUM SERPL-SCNC: 139 MMOL/L (ref 136–145)
SODIUM SERPL-SCNC: 140 MMOL/L (ref 136–145)
SODIUM SERPL-SCNC: 140 MMOL/L (ref 136–145)
SODIUM SERPL-SCNC: 141 MMOL/L (ref 136–145)
SODIUM SERPL-SCNC: 142 MMOL/L (ref 136–145)
SYSTOLIC BLOOD PRESSURE - MUSE: NORMAL MMHG
T AXIS - MUSE: -8 DEGREES
TROPONIN T SERPL HS-MCNC: 17 NG/L
TROPONIN T SERPL HS-MCNC: 19 NG/L
TSH SERPL DL<=0.005 MIU/L-ACNC: 0.9 UIU/ML (ref 0.3–4.2)
TSH SERPL DL<=0.005 MIU/L-ACNC: 2.31 UIU/ML (ref 0.3–4.2)
VENTRICULAR RATE- MUSE: 148 BPM
VIT B12 SERPL-MCNC: >4000 PG/ML (ref 232–1245)
WBC # BLD AUTO: 10.7 10E3/UL (ref 4–11)
WBC # BLD AUTO: 14.2 10E3/UL (ref 4–11)
WBC # BLD AUTO: 14.2 10E3/UL (ref 4–11)
WBC # BLD AUTO: 15.4 10E3/UL (ref 4–11)
WBC # BLD AUTO: 19.7 10E3/UL (ref 4–11)
WBC # BLD AUTO: 24 10E3/UL (ref 4–11)
WBC # FLD AUTO: 590 /UL

## 2022-01-01 PROCEDURE — 84155 ASSAY OF PROTEIN SERUM: CPT | Performed by: INTERNAL MEDICINE

## 2022-01-01 PROCEDURE — 250N000011 HC RX IP 250 OP 636: Performed by: INTERNAL MEDICINE

## 2022-01-01 PROCEDURE — 94640 AIRWAY INHALATION TREATMENT: CPT | Mod: 76

## 2022-01-01 PROCEDURE — 258N000001 HC RX 258: Performed by: INTERNAL MEDICINE

## 2022-01-01 PROCEDURE — 83615 LACTATE (LD) (LDH) ENZYME: CPT | Performed by: INTERNAL MEDICINE

## 2022-01-01 PROCEDURE — 84484 ASSAY OF TROPONIN QUANT: CPT | Performed by: EMERGENCY MEDICINE

## 2022-01-01 PROCEDURE — 250N000013 HC RX MED GY IP 250 OP 250 PS 637: Performed by: INTERNAL MEDICINE

## 2022-01-01 PROCEDURE — 97530 THERAPEUTIC ACTIVITIES: CPT | Mod: GP

## 2022-01-01 PROCEDURE — 85014 HEMATOCRIT: CPT | Performed by: INTERNAL MEDICINE

## 2022-01-01 PROCEDURE — 210N000001 HC R&B IMCU HEART CARE

## 2022-01-01 PROCEDURE — 999N000157 HC STATISTIC RCP TIME EA 10 MIN

## 2022-01-01 PROCEDURE — 82746 ASSAY OF FOLIC ACID SERUM: CPT | Performed by: INTERNAL MEDICINE

## 2022-01-01 PROCEDURE — 250N000009 HC RX 250: Performed by: INTERNAL MEDICINE

## 2022-01-01 PROCEDURE — 87205 SMEAR GRAM STAIN: CPT | Performed by: INTERNAL MEDICINE

## 2022-01-01 PROCEDURE — 85007 BL SMEAR W/DIFF WBC COUNT: CPT | Performed by: EMERGENCY MEDICINE

## 2022-01-01 PROCEDURE — 272N000710 US THORACENTESIS

## 2022-01-01 PROCEDURE — 93010 ELECTROCARDIOGRAM REPORT: CPT | Mod: HIP | Performed by: INTERNAL MEDICINE

## 2022-01-01 PROCEDURE — 80048 BASIC METABOLIC PNL TOTAL CA: CPT | Mod: ORL | Performed by: FAMILY MEDICINE

## 2022-01-01 PROCEDURE — 99232 SBSQ HOSP IP/OBS MODERATE 35: CPT | Performed by: INTERNAL MEDICINE

## 2022-01-01 PROCEDURE — 94640 AIRWAY INHALATION TREATMENT: CPT

## 2022-01-01 PROCEDURE — 96374 THER/PROPH/DIAG INJ IV PUSH: CPT

## 2022-01-01 PROCEDURE — 83735 ASSAY OF MAGNESIUM: CPT | Performed by: INTERNAL MEDICINE

## 2022-01-01 PROCEDURE — 99233 SBSQ HOSP IP/OBS HIGH 50: CPT | Performed by: FAMILY MEDICINE

## 2022-01-01 PROCEDURE — 255N000002 HC RX 255 OP 636: Performed by: INTERNAL MEDICINE

## 2022-01-01 PROCEDURE — 97166 OT EVAL MOD COMPLEX 45 MIN: CPT | Mod: GO

## 2022-01-01 PROCEDURE — 85049 AUTOMATED PLATELET COUNT: CPT | Performed by: INTERNAL MEDICINE

## 2022-01-01 PROCEDURE — 84132 ASSAY OF SERUM POTASSIUM: CPT | Performed by: INTERNAL MEDICINE

## 2022-01-01 PROCEDURE — 83880 ASSAY OF NATRIURETIC PEPTIDE: CPT | Performed by: EMERGENCY MEDICINE

## 2022-01-01 PROCEDURE — 80053 COMPREHEN METABOLIC PANEL: CPT | Performed by: INTERNAL MEDICINE

## 2022-01-01 PROCEDURE — 85730 THROMBOPLASTIN TIME PARTIAL: CPT | Performed by: EMERGENCY MEDICINE

## 2022-01-01 PROCEDURE — 250N000013 HC RX MED GY IP 250 OP 250 PS 637: Performed by: FAMILY MEDICINE

## 2022-01-01 PROCEDURE — 97535 SELF CARE MNGMENT TRAINING: CPT | Mod: GO

## 2022-01-01 PROCEDURE — 258N000003 HC RX IP 258 OP 636: Performed by: INTERNAL MEDICINE

## 2022-01-01 PROCEDURE — 88112 CYTOPATH CELL ENHANCE TECH: CPT | Mod: TC | Performed by: INTERNAL MEDICINE

## 2022-01-01 PROCEDURE — 110N000005 HC R&B HOSPICE, ACCENT

## 2022-01-01 PROCEDURE — 85018 HEMOGLOBIN: CPT | Performed by: INTERNAL MEDICINE

## 2022-01-01 PROCEDURE — 97110 THERAPEUTIC EXERCISES: CPT | Mod: GP

## 2022-01-01 PROCEDURE — 83605 ASSAY OF LACTIC ACID: CPT | Performed by: INTERNAL MEDICINE

## 2022-01-01 PROCEDURE — 99233 SBSQ HOSP IP/OBS HIGH 50: CPT | Performed by: INTERNAL MEDICINE

## 2022-01-01 PROCEDURE — 93306 TTE W/DOPPLER COMPLETE: CPT | Mod: 26 | Performed by: GENERAL ACUTE CARE HOSPITAL

## 2022-01-01 PROCEDURE — 82248 BILIRUBIN DIRECT: CPT | Performed by: INTERNAL MEDICINE

## 2022-01-01 PROCEDURE — 97162 PT EVAL MOD COMPLEX 30 MIN: CPT | Mod: GP

## 2022-01-01 PROCEDURE — 97110 THERAPEUTIC EXERCISES: CPT | Mod: GO

## 2022-01-01 PROCEDURE — C9803 HOPD COVID-19 SPEC COLLECT: HCPCS

## 2022-01-01 PROCEDURE — G1010 CDSM STANSON: HCPCS

## 2022-01-01 PROCEDURE — 272N000202 HC AEROBIKA WITH MANOMETER

## 2022-01-01 PROCEDURE — 0W9B3ZZ DRAINAGE OF LEFT PLEURAL CAVITY, PERCUTANEOUS APPROACH: ICD-10-PCS | Performed by: RADIOLOGY

## 2022-01-01 PROCEDURE — 250N000009 HC RX 250

## 2022-01-01 PROCEDURE — 93005 ELECTROCARDIOGRAM TRACING: CPT | Performed by: EMERGENCY MEDICINE

## 2022-01-01 PROCEDURE — 250N000009 HC RX 250: Performed by: EMERGENCY MEDICINE

## 2022-01-01 PROCEDURE — 88305 TISSUE EXAM BY PATHOLOGIST: CPT | Mod: 26 | Performed by: PATHOLOGY

## 2022-01-01 PROCEDURE — 82607 VITAMIN B-12: CPT | Performed by: INTERNAL MEDICINE

## 2022-01-01 PROCEDURE — 99285 EMERGENCY DEPT VISIT HI MDM: CPT | Mod: 25

## 2022-01-01 PROCEDURE — 258N000003 HC RX IP 258 OP 636

## 2022-01-01 PROCEDURE — 36415 COLL VENOUS BLD VENIPUNCTURE: CPT | Performed by: INTERNAL MEDICINE

## 2022-01-01 PROCEDURE — 85007 BL SMEAR W/DIFF WBC COUNT: CPT | Performed by: INTERNAL MEDICINE

## 2022-01-01 PROCEDURE — 99222 1ST HOSP IP/OBS MODERATE 55: CPT | Performed by: FAMILY MEDICINE

## 2022-01-01 PROCEDURE — 99223 1ST HOSP IP/OBS HIGH 75: CPT | Performed by: INTERNAL MEDICINE

## 2022-01-01 PROCEDURE — 250N000011 HC RX IP 250 OP 636: Performed by: EMERGENCY MEDICINE

## 2022-01-01 PROCEDURE — 71045 X-RAY EXAM CHEST 1 VIEW: CPT

## 2022-01-01 PROCEDURE — 87637 SARSCOV2&INF A&B&RSV AMP PRB: CPT | Performed by: EMERGENCY MEDICINE

## 2022-01-01 PROCEDURE — 84145 PROCALCITONIN (PCT): CPT | Performed by: INTERNAL MEDICINE

## 2022-01-01 PROCEDURE — 99239 HOSP IP/OBS DSCHRG MGMT >30: CPT | Performed by: INTERNAL MEDICINE

## 2022-01-01 PROCEDURE — 87070 CULTURE OTHR SPECIMN AEROBIC: CPT | Performed by: INTERNAL MEDICINE

## 2022-01-01 PROCEDURE — 85027 COMPLETE CBC AUTOMATED: CPT | Performed by: INTERNAL MEDICINE

## 2022-01-01 PROCEDURE — 84157 ASSAY OF PROTEIN OTHER: CPT | Performed by: INTERNAL MEDICINE

## 2022-01-01 PROCEDURE — 99222 1ST HOSP IP/OBS MODERATE 55: CPT | Mod: AI | Performed by: INTERNAL MEDICINE

## 2022-01-01 PROCEDURE — 80048 BASIC METABOLIC PNL TOTAL CA: CPT | Performed by: EMERGENCY MEDICINE

## 2022-01-01 PROCEDURE — 88112 CYTOPATH CELL ENHANCE TECH: CPT | Mod: 26 | Performed by: PATHOLOGY

## 2022-01-01 PROCEDURE — 84443 ASSAY THYROID STIM HORMONE: CPT | Performed by: EMERGENCY MEDICINE

## 2022-01-01 PROCEDURE — 85027 COMPLETE CBC AUTOMATED: CPT | Performed by: EMERGENCY MEDICINE

## 2022-01-01 PROCEDURE — 85610 PROTHROMBIN TIME: CPT | Performed by: EMERGENCY MEDICINE

## 2022-01-01 PROCEDURE — 93005 ELECTROCARDIOGRAM TRACING: CPT

## 2022-01-01 PROCEDURE — 36415 COLL VENOUS BLD VENIPUNCTURE: CPT | Performed by: EMERGENCY MEDICINE

## 2022-01-01 PROCEDURE — 82310 ASSAY OF CALCIUM: CPT | Performed by: INTERNAL MEDICINE

## 2022-01-01 PROCEDURE — 82945 GLUCOSE OTHER FLUID: CPT | Performed by: INTERNAL MEDICINE

## 2022-01-01 PROCEDURE — 89051 BODY FLUID CELL COUNT: CPT | Performed by: INTERNAL MEDICINE

## 2022-01-01 PROCEDURE — 88342 IMHCHEM/IMCYTCHM 1ST ANTB: CPT | Mod: 26 | Performed by: PATHOLOGY

## 2022-01-01 PROCEDURE — 84443 ASSAY THYROID STIM HORMONE: CPT | Mod: ORL | Performed by: FAMILY MEDICINE

## 2022-01-01 PROCEDURE — 80069 RENAL FUNCTION PANEL: CPT | Performed by: INTERNAL MEDICINE

## 2022-01-01 PROCEDURE — 94799 UNLISTED PULMONARY SVC/PX: CPT

## 2022-01-01 PROCEDURE — 88341 IMHCHEM/IMCYTCHM EA ADD ANTB: CPT | Mod: 26 | Performed by: PATHOLOGY

## 2022-01-01 PROCEDURE — 99232 SBSQ HOSP IP/OBS MODERATE 35: CPT | Performed by: HOSPITALIST

## 2022-01-01 RX ORDER — NALOXONE HYDROCHLORIDE 0.4 MG/ML
0.2 INJECTION, SOLUTION INTRAMUSCULAR; INTRAVENOUS; SUBCUTANEOUS
Status: DISCONTINUED | OUTPATIENT
Start: 2022-01-01 | End: 2022-01-01 | Stop reason: HOSPADM

## 2022-01-01 RX ORDER — METOPROLOL TARTRATE 1 MG/ML
5 INJECTION, SOLUTION INTRAVENOUS EVERY 5 MIN PRN
Status: DISCONTINUED | OUTPATIENT
Start: 2022-01-01 | End: 2022-01-01 | Stop reason: HOSPADM

## 2022-01-01 RX ORDER — NALOXONE HYDROCHLORIDE 0.4 MG/ML
0.2 INJECTION, SOLUTION INTRAMUSCULAR; INTRAVENOUS; SUBCUTANEOUS
Status: DISCONTINUED | OUTPATIENT
Start: 2022-01-01 | End: 2022-01-01

## 2022-01-01 RX ORDER — DEXTROSE MONOHYDRATE, SODIUM CHLORIDE, AND POTASSIUM CHLORIDE 50; 1.49; 9 G/1000ML; G/1000ML; G/1000ML
INJECTION, SOLUTION INTRAVENOUS CONTINUOUS
Status: DISPENSED | OUTPATIENT
Start: 2022-01-01 | End: 2022-01-01

## 2022-01-01 RX ORDER — HEPARIN SODIUM,PORCINE 10 UNIT/ML
5-10 VIAL (ML) INTRAVENOUS EVERY 24 HOURS
Status: DISCONTINUED | OUTPATIENT
Start: 2022-01-01 | End: 2022-01-01 | Stop reason: CLARIF

## 2022-01-01 RX ORDER — IPRATROPIUM BROMIDE AND ALBUTEROL SULFATE 2.5; .5 MG/3ML; MG/3ML
3 SOLUTION RESPIRATORY (INHALATION)
Status: DISCONTINUED | OUTPATIENT
Start: 2022-01-01 | End: 2022-01-01

## 2022-01-01 RX ORDER — BISACODYL 10 MG
10 SUPPOSITORY, RECTAL RECTAL DAILY PRN
Status: DISCONTINUED | OUTPATIENT
Start: 2022-01-01 | End: 2022-01-01

## 2022-01-01 RX ORDER — MORPHINE SULFATE 10 MG/5ML
3 SOLUTION ORAL
Status: DISCONTINUED | OUTPATIENT
Start: 2022-01-01 | End: 2022-01-01

## 2022-01-01 RX ORDER — AMOXICILLIN 250 MG
1 CAPSULE ORAL 2 TIMES DAILY PRN
Status: DISCONTINUED | OUTPATIENT
Start: 2022-01-01 | End: 2022-01-01 | Stop reason: HOSPADM

## 2022-01-01 RX ORDER — NALOXONE HYDROCHLORIDE 0.4 MG/ML
0.1 INJECTION, SOLUTION INTRAMUSCULAR; INTRAVENOUS; SUBCUTANEOUS
Status: DISCONTINUED | OUTPATIENT
Start: 2022-01-01 | End: 2022-11-10 | Stop reason: HOSPADM

## 2022-01-01 RX ORDER — GLYCOPYRROLATE 0.2 MG/ML
0.2 INJECTION, SOLUTION INTRAMUSCULAR; INTRAVENOUS EVERY 4 HOURS PRN
Status: DISCONTINUED | OUTPATIENT
Start: 2022-01-01 | End: 2022-11-10 | Stop reason: HOSPADM

## 2022-01-01 RX ORDER — LANOLIN ALCOHOL/MO/W.PET/CERES
400 CREAM (GRAM) TOPICAL DAILY
Status: CANCELLED | OUTPATIENT
Start: 2022-01-01

## 2022-01-01 RX ORDER — IPRATROPIUM BROMIDE AND ALBUTEROL SULFATE 2.5; .5 MG/3ML; MG/3ML
3 SOLUTION RESPIRATORY (INHALATION) EVERY 4 HOURS PRN
Status: CANCELLED | OUTPATIENT
Start: 2022-01-01

## 2022-01-01 RX ORDER — HEPARIN SODIUM (PORCINE) LOCK FLUSH IV SOLN 100 UNIT/ML 100 UNIT/ML
5-10 SOLUTION INTRAVENOUS
Status: DISCONTINUED | OUTPATIENT
Start: 2022-01-01 | End: 2022-01-01 | Stop reason: CLARIF

## 2022-01-01 RX ORDER — CEFTRIAXONE 2 G/1
2 INJECTION, POWDER, FOR SOLUTION INTRAMUSCULAR; INTRAVENOUS EVERY 24 HOURS
Status: DISCONTINUED | OUTPATIENT
Start: 2022-01-01 | End: 2022-01-01 | Stop reason: HOSPADM

## 2022-01-01 RX ORDER — MAGNESIUM HYDROXIDE/ALUMINUM HYDROXICE/SIMETHICONE 120; 1200; 1200 MG/30ML; MG/30ML; MG/30ML
30 SUSPENSION ORAL EVERY 4 HOURS PRN
Status: DISCONTINUED | OUTPATIENT
Start: 2022-01-01 | End: 2022-01-01 | Stop reason: HOSPADM

## 2022-01-01 RX ORDER — MORPHINE SULFATE 2 MG/ML
1-2 INJECTION, SOLUTION INTRAMUSCULAR; INTRAVENOUS
Status: DISCONTINUED | OUTPATIENT
Start: 2022-01-01 | End: 2022-11-10 | Stop reason: HOSPADM

## 2022-01-01 RX ORDER — MAGNESIUM OXIDE 400 MG/1
400 TABLET ORAL EVERY 4 HOURS
Status: DISCONTINUED | OUTPATIENT
Start: 2022-01-01 | End: 2022-01-01

## 2022-01-01 RX ORDER — METOPROLOL SUCCINATE 25 MG/1
25 TABLET, EXTENDED RELEASE ORAL DAILY
Status: DISCONTINUED | OUTPATIENT
Start: 2022-01-01 | End: 2022-11-10 | Stop reason: HOSPADM

## 2022-01-01 RX ORDER — ATROPINE SULFATE 10 MG/ML
2 SOLUTION/ DROPS OPHTHALMIC EVERY 4 HOURS PRN
Status: DISCONTINUED | OUTPATIENT
Start: 2022-01-01 | End: 2022-11-10 | Stop reason: HOSPADM

## 2022-01-01 RX ORDER — BISACODYL 10 MG
10 SUPPOSITORY, RECTAL RECTAL DAILY PRN
Status: CANCELLED | OUTPATIENT
Start: 2022-01-01

## 2022-01-01 RX ORDER — LEVOTHYROXINE SODIUM 88 UG/1
88 TABLET ORAL
COMMUNITY
Start: 2022-01-01

## 2022-01-01 RX ORDER — ACETAMINOPHEN 325 MG/1
650 TABLET ORAL EVERY 6 HOURS PRN
Status: DISCONTINUED | OUTPATIENT
Start: 2022-01-01 | End: 2022-11-10 | Stop reason: HOSPADM

## 2022-01-01 RX ORDER — FUROSEMIDE 10 MG/ML
20 INJECTION INTRAMUSCULAR; INTRAVENOUS ONCE
Status: COMPLETED | OUTPATIENT
Start: 2022-01-01 | End: 2022-01-01

## 2022-01-01 RX ORDER — METOPROLOL SUCCINATE 25 MG/1
25 TABLET, EXTENDED RELEASE ORAL DAILY
Status: DISCONTINUED | OUTPATIENT
Start: 2022-01-01 | End: 2022-01-01 | Stop reason: HOSPADM

## 2022-01-01 RX ORDER — NALOXONE HYDROCHLORIDE 0.4 MG/ML
0.2 INJECTION, SOLUTION INTRAMUSCULAR; INTRAVENOUS; SUBCUTANEOUS
Status: CANCELLED | OUTPATIENT
Start: 2022-01-01

## 2022-01-01 RX ORDER — SENNOSIDES 8.6 MG
8.6 TABLET ORAL 2 TIMES DAILY PRN
COMMUNITY
Start: 2021-04-12

## 2022-01-01 RX ORDER — SODIUM POLYSTYRENE SULFONATE 15 G/60ML
15 SUSPENSION ORAL; RECTAL ONCE
Status: DISCONTINUED | OUTPATIENT
Start: 2022-01-01 | End: 2022-01-01

## 2022-01-01 RX ORDER — BISACODYL 10 MG
10 SUPPOSITORY, RECTAL RECTAL DAILY PRN
Status: DISCONTINUED | OUTPATIENT
Start: 2022-01-01 | End: 2022-01-01 | Stop reason: HOSPADM

## 2022-01-01 RX ORDER — ACETAMINOPHEN 325 MG/1
650 TABLET ORAL EVERY 4 HOURS PRN
Status: CANCELLED | OUTPATIENT
Start: 2022-01-01

## 2022-01-01 RX ORDER — MIRTAZAPINE 15 MG/1
15 TABLET, FILM COATED ORAL AT BEDTIME
Status: DISCONTINUED | OUTPATIENT
Start: 2022-01-01 | End: 2022-01-01 | Stop reason: HOSPADM

## 2022-01-01 RX ORDER — SODIUM CHLORIDE FOR INHALATION 3 %
3 VIAL, NEBULIZER (ML) INHALATION 3 TIMES DAILY
Status: DISPENSED | OUTPATIENT
Start: 2022-01-01 | End: 2022-01-01

## 2022-01-01 RX ORDER — METOPROLOL SUCCINATE 25 MG/1
25 TABLET, EXTENDED RELEASE ORAL DAILY
Status: CANCELLED | OUTPATIENT
Start: 2022-01-01

## 2022-01-01 RX ORDER — DEXTROSE MONOHYDRATE 25 G/50ML
25-50 INJECTION, SOLUTION INTRAVENOUS
Status: DISCONTINUED | OUTPATIENT
Start: 2022-01-01 | End: 2022-01-01 | Stop reason: HOSPADM

## 2022-01-01 RX ORDER — AMOXICILLIN 250 MG
1 CAPSULE ORAL 2 TIMES DAILY PRN
Status: CANCELLED | OUTPATIENT
Start: 2022-01-01

## 2022-01-01 RX ORDER — MIRTAZAPINE 15 MG/1
15 TABLET, FILM COATED ORAL AT BEDTIME
Status: CANCELLED | OUTPATIENT
Start: 2022-01-01

## 2022-01-01 RX ORDER — MIRTAZAPINE 15 MG/1
15 TABLET, FILM COATED ORAL AT BEDTIME
COMMUNITY
Start: 2022-01-01

## 2022-01-01 RX ORDER — AZELASTINE HYDROCHLORIDE 137 UG/1
1 SPRAY, METERED NASAL DAILY
Status: DISCONTINUED | OUTPATIENT
Start: 2022-01-01 | End: 2022-01-01 | Stop reason: HOSPADM

## 2022-01-01 RX ORDER — FUROSEMIDE 10 MG/ML
40 INJECTION INTRAMUSCULAR; INTRAVENOUS ONCE
Status: COMPLETED | OUTPATIENT
Start: 2022-01-01 | End: 2022-01-01

## 2022-01-01 RX ORDER — LORAZEPAM 1 MG/1
1 TABLET ORAL
Status: DISCONTINUED | OUTPATIENT
Start: 2022-01-01 | End: 2022-11-10 | Stop reason: HOSPADM

## 2022-01-01 RX ORDER — MAGNESIUM HYDROXIDE/ALUMINUM HYDROXICE/SIMETHICONE 120; 1200; 1200 MG/30ML; MG/30ML; MG/30ML
30 SUSPENSION ORAL EVERY 4 HOURS PRN
Status: CANCELLED | OUTPATIENT
Start: 2022-01-01

## 2022-01-01 RX ORDER — LANOLIN ALCOHOL/MO/W.PET/CERES
400 CREAM (GRAM) TOPICAL DAILY
Status: DISCONTINUED | OUTPATIENT
Start: 2022-01-01 | End: 2022-01-01 | Stop reason: HOSPADM

## 2022-01-01 RX ORDER — NALOXONE HYDROCHLORIDE 0.4 MG/ML
0.4 INJECTION, SOLUTION INTRAMUSCULAR; INTRAVENOUS; SUBCUTANEOUS
Status: CANCELLED | OUTPATIENT
Start: 2022-01-01

## 2022-01-01 RX ORDER — MORPHINE SULFATE 2 MG/ML
1 INJECTION, SOLUTION INTRAMUSCULAR; INTRAVENOUS
Status: CANCELLED | OUTPATIENT
Start: 2022-01-01

## 2022-01-01 RX ORDER — FOLIC ACID 1 MG/1
1000 TABLET ORAL DAILY
COMMUNITY
Start: 2022-01-01

## 2022-01-01 RX ORDER — NALOXONE HYDROCHLORIDE 0.4 MG/ML
0.4 INJECTION, SOLUTION INTRAMUSCULAR; INTRAVENOUS; SUBCUTANEOUS
Status: DISCONTINUED | OUTPATIENT
Start: 2022-01-01 | End: 2022-01-01 | Stop reason: HOSPADM

## 2022-01-01 RX ORDER — BENZONATATE 100 MG/1
100 CAPSULE ORAL 3 TIMES DAILY PRN
Status: DISCONTINUED | OUTPATIENT
Start: 2022-01-01 | End: 2022-01-01 | Stop reason: HOSPADM

## 2022-01-01 RX ORDER — ENOXAPARIN SODIUM 100 MG/ML
30 INJECTION SUBCUTANEOUS EVERY 24 HOURS
Status: DISCONTINUED | OUTPATIENT
Start: 2022-01-01 | End: 2022-01-01

## 2022-01-01 RX ORDER — ACETAMINOPHEN 650 MG/1
650 SUPPOSITORY RECTAL EVERY 6 HOURS PRN
Status: DISCONTINUED | OUTPATIENT
Start: 2022-01-01 | End: 2022-11-10 | Stop reason: HOSPADM

## 2022-01-01 RX ORDER — FOLIC ACID 1 MG/1
1000 TABLET ORAL DAILY
Status: DISCONTINUED | OUTPATIENT
Start: 2022-01-01 | End: 2022-01-01

## 2022-01-01 RX ORDER — LANOLIN ALCOHOL/MO/W.PET/CERES
3 CREAM (GRAM) TOPICAL
Status: DISCONTINUED | OUTPATIENT
Start: 2022-01-01 | End: 2022-01-01

## 2022-01-01 RX ORDER — LEVOTHYROXINE SODIUM 88 UG/1
88 TABLET ORAL
Status: CANCELLED | OUTPATIENT
Start: 2022-01-01

## 2022-01-01 RX ORDER — IOPAMIDOL 755 MG/ML
95 INJECTION, SOLUTION INTRAVASCULAR ONCE
Status: COMPLETED | OUTPATIENT
Start: 2022-01-01 | End: 2022-01-01

## 2022-01-01 RX ORDER — HEPARIN SODIUM,PORCINE 10 UNIT/ML
5-10 VIAL (ML) INTRAVENOUS
Status: DISCONTINUED | OUTPATIENT
Start: 2022-01-01 | End: 2022-01-01 | Stop reason: CLARIF

## 2022-01-01 RX ORDER — DIAZEPAM 10 MG/2ML
5 INJECTION, SOLUTION INTRAMUSCULAR; INTRAVENOUS EVERY 6 HOURS PRN
Status: DISCONTINUED | OUTPATIENT
Start: 2022-01-01 | End: 2022-11-10 | Stop reason: HOSPADM

## 2022-01-01 RX ORDER — MORPHINE SULFATE 2 MG/ML
1 INJECTION, SOLUTION INTRAMUSCULAR; INTRAVENOUS
Status: DISCONTINUED | OUTPATIENT
Start: 2022-01-01 | End: 2022-01-01 | Stop reason: HOSPADM

## 2022-01-01 RX ORDER — BISACODYL 5 MG
5 TABLET, DELAYED RELEASE (ENTERIC COATED) ORAL DAILY PRN
Status: CANCELLED | OUTPATIENT
Start: 2022-01-01

## 2022-01-01 RX ORDER — SODIUM CHLORIDE FOR INHALATION 3 %
3 VIAL, NEBULIZER (ML) INHALATION 3 TIMES DAILY
Status: COMPLETED | OUTPATIENT
Start: 2022-01-01 | End: 2022-01-01

## 2022-01-01 RX ORDER — MORPHINE SULFATE 10 MG/5ML
3 SOLUTION ORAL EVERY 4 HOURS PRN
Status: CANCELLED | OUTPATIENT
Start: 2022-01-01

## 2022-01-01 RX ORDER — AZELASTINE HYDROCHLORIDE 137 UG/1
1 SPRAY, METERED NASAL DAILY
Status: CANCELLED | OUTPATIENT
Start: 2022-01-01

## 2022-01-01 RX ORDER — METOPROLOL SUCCINATE 50 MG/1
25 TABLET, EXTENDED RELEASE ORAL DAILY
COMMUNITY
Start: 2022-01-01

## 2022-01-01 RX ORDER — IPRATROPIUM BROMIDE AND ALBUTEROL SULFATE 2.5; .5 MG/3ML; MG/3ML
3 SOLUTION RESPIRATORY (INHALATION) 3 TIMES DAILY
Status: DISCONTINUED | OUTPATIENT
Start: 2022-01-01 | End: 2022-01-01

## 2022-01-01 RX ORDER — ESCITALOPRAM OXALATE 5 MG/1
5 TABLET ORAL DAILY
Status: CANCELLED | OUTPATIENT
Start: 2022-01-01

## 2022-01-01 RX ORDER — IPRATROPIUM BROMIDE AND ALBUTEROL SULFATE 2.5; .5 MG/3ML; MG/3ML
3 SOLUTION RESPIRATORY (INHALATION) EVERY 4 HOURS PRN
Status: DISCONTINUED | OUTPATIENT
Start: 2022-01-01 | End: 2022-01-01 | Stop reason: HOSPADM

## 2022-01-01 RX ORDER — BISACODYL 5 MG
5 TABLET, DELAYED RELEASE (ENTERIC COATED) ORAL DAILY PRN
Status: DISCONTINUED | OUTPATIENT
Start: 2022-01-01 | End: 2022-01-01 | Stop reason: HOSPADM

## 2022-01-01 RX ORDER — PROCHLORPERAZINE MALEATE 5 MG
5 TABLET ORAL EVERY 6 HOURS PRN
Status: DISCONTINUED | OUTPATIENT
Start: 2022-01-01 | End: 2022-11-10 | Stop reason: HOSPADM

## 2022-01-01 RX ORDER — MORPHINE SULFATE 20 MG/ML
5-10 SOLUTION ORAL
Status: DISCONTINUED | OUTPATIENT
Start: 2022-01-01 | End: 2022-11-10 | Stop reason: HOSPADM

## 2022-01-01 RX ORDER — METOPROLOL TARTRATE 1 MG/ML
5 INJECTION, SOLUTION INTRAVENOUS EVERY 5 MIN PRN
Status: CANCELLED | OUTPATIENT
Start: 2022-01-01

## 2022-01-01 RX ORDER — LIDOCAINE 40 MG/G
CREAM TOPICAL
Status: DISCONTINUED | OUTPATIENT
Start: 2022-01-01 | End: 2022-11-10 | Stop reason: HOSPADM

## 2022-01-01 RX ORDER — ESCITALOPRAM OXALATE 5 MG/1
5 TABLET ORAL DAILY
COMMUNITY
Start: 2022-01-01

## 2022-01-01 RX ORDER — BENZONATATE 100 MG/1
100 CAPSULE ORAL 3 TIMES DAILY PRN
Status: CANCELLED | OUTPATIENT
Start: 2022-01-01

## 2022-01-01 RX ORDER — ACETAMINOPHEN 325 MG/1
650 TABLET ORAL EVERY 4 HOURS PRN
Status: DISCONTINUED | OUTPATIENT
Start: 2022-01-01 | End: 2022-01-01 | Stop reason: HOSPADM

## 2022-01-01 RX ORDER — LEVOTHYROXINE SODIUM 88 UG/1
88 TABLET ORAL
Status: DISCONTINUED | OUTPATIENT
Start: 2022-01-01 | End: 2022-01-01 | Stop reason: HOSPADM

## 2022-01-01 RX ORDER — GLYCOPYRROLATE 1 MG/1
2 TABLET ORAL EVERY 4 HOURS PRN
Status: DISCONTINUED | OUTPATIENT
Start: 2022-01-01 | End: 2022-11-10 | Stop reason: HOSPADM

## 2022-01-01 RX ORDER — CARBOXYMETHYLCELLULOSE SODIUM 5 MG/ML
1-2 SOLUTION/ DROPS OPHTHALMIC
Status: DISCONTINUED | OUTPATIENT
Start: 2022-01-01 | End: 2022-11-10 | Stop reason: HOSPADM

## 2022-01-01 RX ORDER — NICOTINE POLACRILEX 4 MG
15-30 LOZENGE BUCCAL
Status: CANCELLED | OUTPATIENT
Start: 2022-01-01

## 2022-01-01 RX ORDER — AZELASTINE HYDROCHLORIDE 137 UG/1
1 SPRAY, METERED NASAL DAILY
COMMUNITY
Start: 2022-01-01

## 2022-01-01 RX ORDER — NALOXONE HYDROCHLORIDE 0.4 MG/ML
0.2 INJECTION, SOLUTION INTRAMUSCULAR; INTRAVENOUS; SUBCUTANEOUS
Status: DISCONTINUED | OUTPATIENT
Start: 2022-01-01 | End: 2022-11-10 | Stop reason: HOSPADM

## 2022-01-01 RX ORDER — BISACODYL 10 MG
10 SUPPOSITORY, RECTAL RECTAL
Status: DISCONTINUED | OUTPATIENT
Start: 2022-11-10 | End: 2022-11-10 | Stop reason: HOSPADM

## 2022-01-01 RX ORDER — MORPHINE SULFATE 10 MG/5ML
3 SOLUTION ORAL EVERY 4 HOURS PRN
Status: DISCONTINUED | OUTPATIENT
Start: 2022-01-01 | End: 2022-01-01 | Stop reason: HOSPADM

## 2022-01-01 RX ORDER — NALOXONE HYDROCHLORIDE 0.4 MG/ML
0.4 INJECTION, SOLUTION INTRAMUSCULAR; INTRAVENOUS; SUBCUTANEOUS
Status: DISCONTINUED | OUTPATIENT
Start: 2022-01-01 | End: 2022-01-01

## 2022-01-01 RX ORDER — PROCHLORPERAZINE 25 MG
12.5 SUPPOSITORY, RECTAL RECTAL EVERY 12 HOURS PRN
Status: DISCONTINUED | OUTPATIENT
Start: 2022-01-01 | End: 2022-11-10 | Stop reason: HOSPADM

## 2022-01-01 RX ORDER — DEXTROSE MONOHYDRATE 25 G/50ML
25-50 INJECTION, SOLUTION INTRAVENOUS
Status: CANCELLED | OUTPATIENT
Start: 2022-01-01

## 2022-01-01 RX ORDER — MORPHINE SULFATE 2 MG/ML
1-2 INJECTION, SOLUTION INTRAMUSCULAR; INTRAVENOUS
Status: DISCONTINUED | OUTPATIENT
Start: 2022-01-01 | End: 2022-01-01

## 2022-01-01 RX ORDER — NICOTINE POLACRILEX 4 MG
15-30 LOZENGE BUCCAL
Status: DISCONTINUED | OUTPATIENT
Start: 2022-01-01 | End: 2022-01-01 | Stop reason: HOSPADM

## 2022-01-01 RX ORDER — MAGNESIUM SULFATE HEPTAHYDRATE 40 MG/ML
2 INJECTION, SOLUTION INTRAVENOUS ONCE
Status: COMPLETED | OUTPATIENT
Start: 2022-01-01 | End: 2022-01-01

## 2022-01-01 RX ORDER — ESCITALOPRAM OXALATE 5 MG/1
5 TABLET ORAL DAILY
Status: DISCONTINUED | OUTPATIENT
Start: 2022-01-01 | End: 2022-01-01 | Stop reason: HOSPADM

## 2022-01-01 RX ORDER — LORAZEPAM 2 MG/ML
1 CONCENTRATE ORAL
Status: DISCONTINUED | OUTPATIENT
Start: 2022-01-01 | End: 2022-11-10 | Stop reason: HOSPADM

## 2022-01-01 RX ORDER — MORPHINE SULFATE 10 MG/5ML
5-10 SOLUTION ORAL
Status: DISCONTINUED | OUTPATIENT
Start: 2022-01-01 | End: 2022-11-10 | Stop reason: HOSPADM

## 2022-01-01 RX ORDER — SODIUM CHLORIDE FOR INHALATION 3 %
3 VIAL, NEBULIZER (ML) INHALATION
Status: DISCONTINUED | OUTPATIENT
Start: 2022-01-01 | End: 2022-01-01

## 2022-01-01 RX ORDER — LEVOTHYROXINE SODIUM 88 UG/1
88 TABLET ORAL
Status: DISCONTINUED | OUTPATIENT
Start: 2022-01-01 | End: 2022-11-10 | Stop reason: HOSPADM

## 2022-01-01 RX ORDER — CEFTRIAXONE 2 G/1
2 INJECTION, POWDER, FOR SOLUTION INTRAMUSCULAR; INTRAVENOUS EVERY 24 HOURS
Status: CANCELLED | OUTPATIENT
Start: 2022-01-01 | End: 2022-01-01

## 2022-01-01 RX ADMIN — METOPROLOL SUCCINATE 25 MG: 25 TABLET, EXTENDED RELEASE ORAL at 08:29

## 2022-01-01 RX ADMIN — ENOXAPARIN SODIUM 30 MG: 30 INJECTION SUBCUTANEOUS at 17:12

## 2022-01-01 RX ADMIN — Medication 400 MCG: at 09:13

## 2022-01-01 RX ADMIN — DEXTROSE MONOHYDRATE 25 ML: 25 INJECTION, SOLUTION INTRAVENOUS at 07:37

## 2022-01-01 RX ADMIN — CEFTRIAXONE SODIUM 2 G: 2 INJECTION, POWDER, FOR SOLUTION INTRAMUSCULAR; INTRAVENOUS at 13:09

## 2022-01-01 RX ADMIN — Medication 3 MG: at 01:03

## 2022-01-01 RX ADMIN — AZITHROMYCIN MONOHYDRATE 250 MG: 500 INJECTION, POWDER, LYOPHILIZED, FOR SOLUTION INTRAVENOUS at 15:52

## 2022-01-01 RX ADMIN — MORPHINE SULFATE 10 MG: 20 SOLUTION ORAL at 03:15

## 2022-01-01 RX ADMIN — FUROSEMIDE 20 MG: 10 INJECTION, SOLUTION INTRAMUSCULAR; INTRAVENOUS at 09:20

## 2022-01-01 RX ADMIN — SODIUM CHLORIDE SOLN NEBU 3% 3 ML: 3 NEBU SOLN at 14:14

## 2022-01-01 RX ADMIN — IPRATROPIUM BROMIDE AND ALBUTEROL SULFATE 3 ML: 2.5; .5 SOLUTION RESPIRATORY (INHALATION) at 14:14

## 2022-01-01 RX ADMIN — IPRATROPIUM BROMIDE AND ALBUTEROL SULFATE 3 ML: 2.5; .5 SOLUTION RESPIRATORY (INHALATION) at 08:24

## 2022-01-01 RX ADMIN — ESCITALOPRAM 5 MG: 5 TABLET, FILM COATED ORAL at 09:14

## 2022-01-01 RX ADMIN — METOPROLOL SUCCINATE 25 MG: 25 TABLET, EXTENDED RELEASE ORAL at 10:10

## 2022-01-01 RX ADMIN — METOPROLOL SUCCINATE 25 MG: 25 TABLET, EXTENDED RELEASE ORAL at 09:13

## 2022-01-01 RX ADMIN — AZELASTINE HYDROCHLORIDE 1 SPRAY: 137 SPRAY, METERED NASAL at 09:11

## 2022-01-01 RX ADMIN — IPRATROPIUM BROMIDE AND ALBUTEROL SULFATE 3 ML: 2.5; .5 SOLUTION RESPIRATORY (INHALATION) at 19:46

## 2022-01-01 RX ADMIN — CEFTRIAXONE SODIUM 2 G: 2 INJECTION, POWDER, FOR SOLUTION INTRAMUSCULAR; INTRAVENOUS at 13:31

## 2022-01-01 RX ADMIN — IOPAMIDOL 95 ML: 755 INJECTION, SOLUTION INTRAVENOUS at 20:48

## 2022-01-01 RX ADMIN — PERFLUTREN 2 ML: 6.52 INJECTION, SUSPENSION INTRAVENOUS at 13:24

## 2022-01-01 RX ADMIN — CEFTRIAXONE SODIUM 2 G: 2 INJECTION, POWDER, FOR SOLUTION INTRAMUSCULAR; INTRAVENOUS at 11:40

## 2022-01-01 RX ADMIN — IPRATROPIUM BROMIDE AND ALBUTEROL SULFATE 3 ML: 2.5; .5 SOLUTION RESPIRATORY (INHALATION) at 08:58

## 2022-01-01 RX ADMIN — MORPHINE SULFATE 2 MG: 2 INJECTION, SOLUTION INTRAMUSCULAR; INTRAVENOUS at 14:33

## 2022-01-01 RX ADMIN — LEVOTHYROXINE SODIUM 88 MCG: 88 TABLET ORAL at 08:28

## 2022-01-01 RX ADMIN — METOPROLOL SUCCINATE 25 MG: 25 TABLET, EXTENDED RELEASE ORAL at 09:12

## 2022-01-01 RX ADMIN — SODIUM CHLORIDE 500 ML: 9 INJECTION, SOLUTION INTRAVENOUS at 01:23

## 2022-01-01 RX ADMIN — FUROSEMIDE 40 MG: 10 INJECTION, SOLUTION INTRAMUSCULAR; INTRAVENOUS at 23:18

## 2022-01-01 RX ADMIN — ENOXAPARIN SODIUM 30 MG: 30 INJECTION SUBCUTANEOUS at 17:23

## 2022-01-01 RX ADMIN — CEFTRIAXONE SODIUM 2 G: 2 INJECTION, POWDER, FOR SOLUTION INTRAMUSCULAR; INTRAVENOUS at 13:56

## 2022-01-01 RX ADMIN — IPRATROPIUM BROMIDE AND ALBUTEROL SULFATE 3 ML: 2.5; .5 SOLUTION RESPIRATORY (INHALATION) at 14:22

## 2022-01-01 RX ADMIN — MORPHINE SULFATE 2 MG: 2 INJECTION, SOLUTION INTRAMUSCULAR; INTRAVENOUS at 12:58

## 2022-01-01 RX ADMIN — SODIUM CHLORIDE SOLN NEBU 3% 3 ML: 3 NEBU SOLN at 14:39

## 2022-01-01 RX ADMIN — LEVOTHYROXINE SODIUM 88 MCG: 88 TABLET ORAL at 09:12

## 2022-01-01 RX ADMIN — CEFTRIAXONE SODIUM 2 G: 2 INJECTION, POWDER, FOR SOLUTION INTRAMUSCULAR; INTRAVENOUS at 14:58

## 2022-01-01 RX ADMIN — FOLIC ACID 1000 MCG: 1 TABLET ORAL at 08:20

## 2022-01-01 RX ADMIN — METOPROLOL TARTRATE 5 MG: 5 INJECTION INTRAVENOUS at 01:47

## 2022-01-01 RX ADMIN — SODIUM CHLORIDE SOLN NEBU 3% 3 ML: 3 NEBU SOLN at 19:47

## 2022-01-01 RX ADMIN — SODIUM CHLORIDE SOLN NEBU 3% 3 ML: 3 NEBU SOLN at 08:58

## 2022-01-01 RX ADMIN — MORPHINE SULFATE 10 MG: 20 SOLUTION ORAL at 21:13

## 2022-01-01 RX ADMIN — ESCITALOPRAM 5 MG: 5 TABLET, FILM COATED ORAL at 08:20

## 2022-01-01 RX ADMIN — ESCITALOPRAM 5 MG: 5 TABLET, FILM COATED ORAL at 10:10

## 2022-01-01 RX ADMIN — MORPHINE SULFATE 1 MG: 2 INJECTION, SOLUTION INTRAMUSCULAR; INTRAVENOUS at 09:15

## 2022-01-01 RX ADMIN — FOLIC ACID 1000 MCG: 1 TABLET ORAL at 10:10

## 2022-01-01 RX ADMIN — SODIUM CHLORIDE SOLN NEBU 3% 3 ML: 3 NEBU SOLN at 08:32

## 2022-01-01 RX ADMIN — Medication 3 MG: at 13:31

## 2022-01-01 RX ADMIN — AZITHROMYCIN MONOHYDRATE 250 MG: 500 INJECTION, POWDER, LYOPHILIZED, FOR SOLUTION INTRAVENOUS at 14:45

## 2022-01-01 RX ADMIN — POTASSIUM CHLORIDE, DEXTROSE MONOHYDRATE AND SODIUM CHLORIDE: 150; 5; 900 INJECTION, SOLUTION INTRAVENOUS at 14:08

## 2022-01-01 RX ADMIN — ENOXAPARIN SODIUM 30 MG: 30 INJECTION SUBCUTANEOUS at 23:55

## 2022-01-01 RX ADMIN — DEXTROSE AND SODIUM CHLORIDE: 5; 900 INJECTION, SOLUTION INTRAVENOUS at 07:36

## 2022-01-01 RX ADMIN — MAGNESIUM SULFATE HEPTAHYDRATE 2 G: 40 INJECTION, SOLUTION INTRAVENOUS at 23:55

## 2022-01-01 RX ADMIN — AZELASTINE HYDROCHLORIDE 1 SPRAY: 137 SPRAY, METERED NASAL at 08:28

## 2022-01-01 RX ADMIN — AZITHROMYCIN MONOHYDRATE 250 MG: 500 INJECTION, POWDER, LYOPHILIZED, FOR SOLUTION INTRAVENOUS at 13:01

## 2022-01-01 RX ADMIN — SODIUM CHLORIDE SOLN NEBU 3% 3 ML: 3 NEBU SOLN at 08:11

## 2022-01-01 RX ADMIN — LEVOTHYROXINE SODIUM 88 MCG: 88 TABLET ORAL at 06:41

## 2022-01-01 RX ADMIN — POTASSIUM CHLORIDE, DEXTROSE MONOHYDRATE AND SODIUM CHLORIDE: 150; 5; 900 INJECTION, SOLUTION INTRAVENOUS at 03:15

## 2022-01-01 RX ADMIN — LEVOTHYROXINE SODIUM 88 MCG: 88 TABLET ORAL at 06:52

## 2022-01-01 RX ADMIN — IPRATROPIUM BROMIDE AND ALBUTEROL SULFATE 3 ML: 2.5; .5 SOLUTION RESPIRATORY (INHALATION) at 10:01

## 2022-01-01 RX ADMIN — AZELASTINE HYDROCHLORIDE 1 SPRAY: 137 SPRAY, METERED NASAL at 10:13

## 2022-01-01 RX ADMIN — CEFTRIAXONE SODIUM 2 G: 2 INJECTION, POWDER, FOR SOLUTION INTRAMUSCULAR; INTRAVENOUS at 14:01

## 2022-01-01 RX ADMIN — IPRATROPIUM BROMIDE AND ALBUTEROL SULFATE 3 ML: 2.5; .5 SOLUTION RESPIRATORY (INHALATION) at 20:31

## 2022-01-01 RX ADMIN — METOPROLOL SUCCINATE 25 MG: 25 TABLET, EXTENDED RELEASE ORAL at 08:20

## 2022-01-01 RX ADMIN — AZITHROMYCIN MONOHYDRATE 250 MG: 500 INJECTION, POWDER, LYOPHILIZED, FOR SOLUTION INTRAVENOUS at 14:54

## 2022-01-01 RX ADMIN — ESCITALOPRAM 5 MG: 5 TABLET, FILM COATED ORAL at 09:12

## 2022-01-01 RX ADMIN — ESCITALOPRAM 5 MG: 5 TABLET, FILM COATED ORAL at 08:29

## 2022-01-01 RX ADMIN — AZELASTINE HYDROCHLORIDE 1 SPRAY: 137 SPRAY, METERED NASAL at 08:21

## 2022-01-01 RX ADMIN — MORPHINE SULFATE 5 MG: 10 SOLUTION ORAL at 12:11

## 2022-01-01 RX ADMIN — AZELASTINE HYDROCHLORIDE 1 SPRAY: 137 SPRAY, METERED NASAL at 09:03

## 2022-01-01 RX ADMIN — LEVOTHYROXINE SODIUM 88 MCG: 88 TABLET ORAL at 09:08

## 2022-01-01 RX ADMIN — AZELASTINE HYDROCHLORIDE 1 SPRAY: 137 SPRAY, METERED NASAL at 09:12

## 2022-01-01 RX ADMIN — Medication 3 MG: at 05:28

## 2022-01-01 RX ADMIN — AZITHROMYCIN MONOHYDRATE 500 MG: 500 INJECTION, POWDER, LYOPHILIZED, FOR SOLUTION INTRAVENOUS at 17:53

## 2022-01-01 RX ADMIN — SODIUM CHLORIDE SOLN NEBU 3% 3 ML: 3 NEBU SOLN at 20:32

## 2022-01-01 RX ADMIN — Medication 3 MG: at 01:53

## 2022-01-01 RX ADMIN — Medication 400 MCG: at 09:12

## 2022-01-01 RX ADMIN — FOLIC ACID 1000 MCG: 1 TABLET ORAL at 08:29

## 2022-01-01 ASSESSMENT — ACTIVITIES OF DAILY LIVING (ADL)
ADLS_ACUITY_SCORE: 47
ADLS_ACUITY_SCORE: 51
ADLS_ACUITY_SCORE: 35
ADLS_ACUITY_SCORE: 55
ADLS_ACUITY_SCORE: 51
ADLS_ACUITY_SCORE: 47
ADLS_ACUITY_SCORE: 55
DOING_ERRANDS_INDEPENDENTLY_DIFFICULTY: YES
ADLS_ACUITY_SCORE: 45
ADLS_ACUITY_SCORE: 47
ADLS_ACUITY_SCORE: 43
DIFFICULTY_EATING/SWALLOWING: NO
ADLS_ACUITY_SCORE: 51
ADLS_ACUITY_SCORE: 45
ADLS_ACUITY_SCORE: 51
ADLS_ACUITY_SCORE: 37
ADLS_ACUITY_SCORE: 41
ADLS_ACUITY_SCORE: 45
ADLS_ACUITY_SCORE: 59
ADLS_ACUITY_SCORE: 47
TOILETING: 0-->NOT TOILET TRAINED OR ASSISTANCE NEEDED (DEVELOPMENTALLY APPROPRIATE)
ADLS_ACUITY_SCORE: 51
ADLS_ACUITY_SCORE: 47
ADLS_ACUITY_SCORE: 59
BATHING: 1-->ASSISTANCE NEEDED
ADLS_ACUITY_SCORE: 57
EQUIPMENT_CURRENTLY_USED_AT_HOME: CANE, STRAIGHT;WALKER, ROLLING
ADLS_ACUITY_SCORE: 57
ADLS_ACUITY_SCORE: 59
ADLS_ACUITY_SCORE: 47
CONCENTRATING,_REMEMBERING_OR_MAKING_DECISIONS_DIFFICULTY: NO
ADLS_ACUITY_SCORE: 45
ADLS_ACUITY_SCORE: 51
ADLS_ACUITY_SCORE: 45
DRESS: 1-->ASSISTANCE (EQUIPMENT/PERSON) NEEDED
FALL_HISTORY_WITHIN_LAST_SIX_MONTHS: NO
ADLS_ACUITY_SCORE: 45
ADLS_ACUITY_SCORE: 47
ADLS_ACUITY_SCORE: 43
ADLS_ACUITY_SCORE: 45
ADLS_ACUITY_SCORE: 37
TOILETING_ASSISTANCE: TOILETING DIFFICULTY, ASSISTANCE 1 PERSON
ADLS_ACUITY_SCORE: 51
ADLS_ACUITY_SCORE: 35
ADLS_ACUITY_SCORE: 51
ADLS_ACUITY_SCORE: 37
ADLS_ACUITY_SCORE: 51
ADLS_ACUITY_SCORE: 51
ADLS_ACUITY_SCORE: 47
ADLS_ACUITY_SCORE: 55
ADLS_ACUITY_SCORE: 35
ADLS_ACUITY_SCORE: 57
ADLS_ACUITY_SCORE: 37
ADLS_ACUITY_SCORE: 47
ADLS_ACUITY_SCORE: 43
ADLS_ACUITY_SCORE: 45
ADLS_ACUITY_SCORE: 35
ADLS_ACUITY_SCORE: 45
CHANGE_IN_FUNCTIONAL_STATUS_SINCE_ONSET_OF_CURRENT_ILLNESS/INJURY: NO
ADLS_ACUITY_SCORE: 55
ADLS_ACUITY_SCORE: 37
ADLS_ACUITY_SCORE: 35
ADLS_ACUITY_SCORE: 35
ADLS_ACUITY_SCORE: 47
ADLS_ACUITY_SCORE: 45
ADLS_ACUITY_SCORE: 47
ADLS_ACUITY_SCORE: 47
ADLS_ACUITY_SCORE: 57
TRANSFERRING: 1-->ASSISTANCE (EQUIPMENT/PERSON) NEEDED (NOT DEVELOPMENTALLY APPROPRIATE)
ADLS_ACUITY_SCORE: 45
ADLS_ACUITY_SCORE: 57
ADLS_ACUITY_SCORE: 51
VISION_MANAGEMENT: GLASSES
WEAR_GLASSES_OR_BLIND: YES
ADLS_ACUITY_SCORE: 59
ADLS_ACUITY_SCORE: 55
DEPENDENT_IADLS:: CLEANING;COOKING;LAUNDRY;SHOPPING;MEAL PREPARATION;MEDICATION MANAGEMENT;MONEY MANAGEMENT;TRANSPORTATION;INCONTINENCE
ADLS_ACUITY_SCORE: 45
ADLS_ACUITY_SCORE: 37
TOILETING_ISSUES: YES
WALKING_OR_CLIMBING_STAIRS: AMBULATION DIFFICULTY, ASSISTANCE 1 PERSON;STAIR CLIMBING DIFFICULTY, ASSISTANCE 1 PERSON;TRANSFERRING DIFFICULTY, ASSISTANCE 1 PERSON
ADLS_ACUITY_SCORE: 35
TOILETING: 1-->ASSISTANCE (EQUIPMENT/PERSON) NEEDED
ADLS_ACUITY_SCORE: 47
ADLS_ACUITY_SCORE: 51
ADLS_ACUITY_SCORE: 47
ADLS_ACUITY_SCORE: 51
ADLS_ACUITY_SCORE: 45
ADLS_ACUITY_SCORE: 47
ADLS_ACUITY_SCORE: 37
DRESSING/BATHING: BATHING DIFFICULTY, ASSISTANCE 1 PERSON;DRESSING DIFFICULTY, ASSISTANCE 1 PERSON
ADLS_ACUITY_SCORE: 59
ADLS_ACUITY_SCORE: 45
ADLS_ACUITY_SCORE: 35
ADLS_ACUITY_SCORE: 47
ADLS_ACUITY_SCORE: 51
ADLS_ACUITY_SCORE: 51
ADLS_ACUITY_SCORE: 37
TRANSFERRING: 1-->ASSISTANCE (EQUIPMENT/PERSON) NEEDED
ADLS_ACUITY_SCORE: 51
ADLS_ACUITY_SCORE: 43
ADLS_ACUITY_SCORE: 45
ADLS_ACUITY_SCORE: 59
ADLS_ACUITY_SCORE: 59
ADLS_ACUITY_SCORE: 57
ADLS_ACUITY_SCORE: 45
DRESS: 0-->ASSISTANCE NEEDED (DEVELOPMENTALLY APPROPRIATE)
ADLS_ACUITY_SCORE: 35
DRESSING/BATHING_DIFFICULTY: YES
WALKING_OR_CLIMBING_STAIRS_DIFFICULTY: YES
ADLS_ACUITY_SCORE: 51
ADLS_ACUITY_SCORE: 47
ADLS_ACUITY_SCORE: 47
ADLS_ACUITY_SCORE: 59
ADLS_ACUITY_SCORE: 35

## 2022-11-01 PROBLEM — D69.6 THROMBOCYTOPENIA (H): Status: ACTIVE | Noted: 2022-01-01

## 2022-11-01 PROBLEM — D72.829 LEUKOCYTOSIS, UNSPECIFIED TYPE: Status: ACTIVE | Noted: 2022-01-01

## 2022-11-01 PROBLEM — J96.21 ACUTE ON CHRONIC RESPIRATORY FAILURE WITH HYPOXIA (H): Status: ACTIVE | Noted: 2022-01-01

## 2022-11-01 PROBLEM — R09.02 HYPOXIA: Status: ACTIVE | Noted: 2022-01-01

## 2022-11-01 NOTE — ED NOTES
ED Triage Provider Note  [facility] EMERGENCY DEPARTMENT  Encounter Date: Nov 1, 2022    History:  Chief Complaint   Patient presents with     Shortness of Breath            Generalized Weakness     Jennifer Rashid is a 89 year old female who presents to the ED with generalized weakness. Has active lung Cancer. EMS arrived and found sats in 70s, placed on 6L NC.     Review of Systems:  No CP    Exam:  BP (!) 153/91   Pulse 111   Temp 98  F (36.7  C)   Resp (!) 32   Wt 43.5 kg (96 lb)   SpO2 94%   BMI 16.48 kg/m    General:ill appearing, hypoxic. Appears stated age.   Cardio: tachycardic rate, extremities well perfused  Resp: Normal work of breathing, grossly normal respiratory rate  Neuro: Alert. Facial movement grossly symmetric. Grossly intact strength.       Medical Decision Making:    Patient arriving to the ED with problem as above. A medical screening exam was performed. Initial differential diagnosis includes but not limited to infection, PE, effusion, acs.    Labs, covid, CTPE orders initiated from Triage. The patient is most appropriate to be immediately roomed.       Ita Kahn MD  11/1/2022 at 6:41 PM     Ita Kahn MD  11/01/22 1843       Ita Kahn MD  11/01/22 1843

## 2022-11-01 NOTE — ED TRIAGE NOTES
Presents from home to ED via [facility]. Feeling lethargic, weak and having diarrhea x 3 days. Denies chest pain, dizziness or pain in any location.    On scene, fire found her to be at 70% SPO2 on room air. She was placed on NRB mask then. On EMS arrival, they titrated her down to 4LNC O2. Patient is tachypneic. Oriented x4. On Chemo for lung CA. Has an 18g LAC from EMS. Has a port that is not currently accessed.

## 2022-11-02 NOTE — CONSULTS
"Care Management Initial Consult    General Information  Assessment completed with: Spouse or significant other, Salvatore  via phone  Type of CM/SW Visit: Initial Assessment    Primary Care Provider verified and updated as needed: Yes   Readmission within the last 30 days: no previous admission in last 30 days      Reason for Consult: discharge planning  Advance Care Planning: Advance Care Planning Reviewed: no concerns identified          Communication Assessment  Patient's communication style: spoken language (English or Bilingual)             Cognitive  Cognitive/Neuro/Behavioral: WDL                      Living Environment:   People in home: facility resident     Current living Arrangements: assisted living  Name of Facility: [facility]   Able to return to prior arrangements: other (see comments) (unknown at this time if she can return to Assisted Living or needing TCU at d/c.)       Family/Social Support:  Care provided by: self, other (see comments) (Assisted Living staff)  Provides care for: no one, unable/limited ability to care for self  Marital Status:   Facility resident(s)/Staff, , Children (\"We have 2 sons. The one son lives here in the [facility]. Our other son is a doctor in [place] and visits about once a week\".)  Salvatore       Description of Support System: Supportive, Involved    Support Assessment: Adequate family and caregiver support, Adequate social supports, Patient communicates needs well met    Current Resources:   Patient receiving home care services: No     Community Resources: Skilled Nursing Facility, OP Infusion (\"[facility]; gets [facility]\")  Equipment currently used at home: walker, rolling, other (see comments) (\"port a cath\")  Supplies currently used at home: Incontinence Supplies, Other (\"glasses\")    Employment/Financial:  Employment Status: retired        Financial Concerns:     Referral to Financial Worker: " "No       Lifestyle & Psychosocial Needs:  Social Determinants of Health     Tobacco Use: Not on file   Alcohol Use: Not on file   Financial Resource Strain: Not on file   Food Insecurity: Not on file   Transportation Needs: Not on file   Physical Activity: Not on file   Stress: Not on file   Social Connections: Not on file   Intimate Partner Violence: Not on file   Depression: Not on file   Housing Stability: Not on file       Functional Status:  Prior to admission patient needed assistance:   Dependent ADLs:: Ambulation-walker, Bathing, Toileting, Incontinence  Dependent IADLs:: Cleaning, Cooking, Laundry, Shopping, Meal Preparation, Medication Management, Money Management, Transportation, Incontinence  Assesssment of Functional Status: At functional baseline    Mental Health Status:          Chemical Dependency Status:                Values/Beliefs:  Spiritual, Cultural Beliefs, Religion Practices, Values that affect care: yes  Description of Beliefs that Will Affect Care: Sikhism    Cultural/Religion Practices Patient Routinely Participates In: ceremony, prayer       Additional Information:  Jennifer lives at [facility] and she is on the waiting list for Memory Care in that same building.    She gets assistance at the [facility] with bathing, toileting/incontinence, housekeeping, laundry, meals, and medications. Family helps with transportation.    Per , \"we have 2 sons. The one son lives here in the [facility]. Our other son is a doctor in [place], [place] and visits about once a week\".    She uses a walker for mobility.    Unknown if that is a high enough level of care at the [facility] living or needing TCU at discharge. Awaiting hospital course and PT recommendation.  is willing to increase her services at the [facility] if that is recommended also.    [facility] transport at discharge.    Stella Harris RN      "

## 2022-11-02 NOTE — PROGRESS NOTES
PT assessed to initiate flutter valve therapy. PT was unable to stay awake and seemed very weak. Message sent to Dr. Garcia to discontinue. He would like for RT to try when PT is more awake. RT will continue to follow.    Mervin Greenfield, RT

## 2022-11-02 NOTE — PROGRESS NOTES
[facility]    Medicine Progress Note - Hospitalist Service    Date of Admission:  11/1/2022    Assessment & Plan     Jennifer Rashid is a 89 year old female with past medical history of recurrent right lung cancer, recurrent right pleural effusion requiring thoracocentesis, HTN who presented to ED for evaluation of shortness of breath, found to have recurrent pleural effusion and admitted for further management.       Acute respiratory failure secondary to recurrent pleural effusion;  Recurrent right lung cancer;  --On admission CT chest negative for PE but reported mild to moderate increase in size of loculated left pleural effusion causing increased left lower lobe atelectasis.  --On admission required 4 L O2, after thoracocentesis down to 1 to 2 L.  Titrate O2 as able.   -- Incentive spirometry, flutter valve.  --Follow-up on pleural fluid studies  --Oncology consulted and awaiting input    Leukocytosis; likely multifactorial-possibly due to underlying cancer, cannot rule out pneumonia given atelectasis and shortness of breath.  --No reported fever.  --Check procalcitonin  --Start antibiotics Rocephin and azithromycin for now  -- Aspiration precaution    Mild lactic acidosis; uncertain significance  --Recheck trending down despite no intervention.  Hemodynamically stable, afebrile.  Hesitant to give bolus IV fluid given recurrent pleural effusion, elevated BNP.  --On antibiotics for possible pneumonia    Essential hypertension;  -- PTA metoprolol    Chronic thrombocytopenia; likely from chemotherapy  --Trend    Hypothyroidism;  -- TSH normal, continue home Synthyroid    Goal of care; palliative care consulted and discussed with palliative care provider.    DVT prophylaxis; subcu Lovenox       Diet: Regular Diet Adult    DVT Prophylaxis: Enoxaparin (Lovenox) SQ  Upton Catheter: Not present  Central Lines: None  Cardiac Monitoring: None  Code Status: Full Code      Disposition Plan     "  Expected Discharge Date: 11/04/2022                The patient's care was discussed with the Bedside Nurse, Care Coordinator/, Patient and Called patient's  and left message with callback number..    Jose EDGE MD  Hospitalist Service  [facility]  Securely message with the Vocera Web Console (learn more here)  Text page via [employer] Paging/Directory         Clinically Significant Risk Factors Present on Admission              # Hypoalbuminemia: Lowest albumin = 3 g/dL (Ref range: 3.5-5.2) at 11/2/2022  8:20 AM, will monitor as appropriate  # Coagulation Defect: INR = 1.42 (Ref range: 0.85 - 1.15) and/or PTT = 32 Seconds (Ref range: 22 - 38 Seconds), will monitor for bleeding  # Thrombocytopenia: Lowest platelets = 124 (Ref range: 150-450) in last 2 days, will monitor for bleeding       # Cachexia: Estimated body mass index is 16.48 kg/m  as calculated from the following:    Height as of this encounter: 1.626 m (5' 4\").    Weight as of this encounter: 43.5 kg (96 lb).           ______________________________________________________________________    Interval History   Patient seen and examined multiple times today.  Notes, labs, imaging report personally reviewed.  Patient sleeping, arousable, follows simple commands appropriately.  Able to tell full name, date of birth, hospital name, year, president name.  Patient reports breathing better after thoracocentesis.  Denied cough.  Denied fever.  However, patient seems sleepy on both of my visits, will get CT head.  Discussed with nursing staff at bedside.    Data reviewed today: I reviewed all medications, new labs and imaging results over the last 24 hours. I personally reviewed .    Physical Exam   Vital Signs: Temp: 98  F (36.7  C) Temp src: Oral BP: 125/68 Pulse: 93   Resp: 24 SpO2: 100 % O2 Device: Oxymask Oxygen Delivery: 3 LPM  Weight: 95 lbs 15.99 oz    General: Not in obvious distress.  Cachectic  HEENT: " Normocephalic, supple neck  Chest: Clear to auscultation bilateral anteriorly, no wheezing but decreased breath sound on left lung field  Heart: S1S2 normal, regular  Abdomen: Soft. NT, ND. Bowel sounds- active.  Extremities: No legs swelling  Neuro: alert and awake, follows simple commands appropriately, moves all extremities      Data   Recent Labs   Lab 11/02/22  1312 11/02/22  0820 11/01/22  2114 11/01/22  1849   WBC  --  14.2*  14.2*  --  19.7*   HGB  --  11.8  11.8  --  11.9   MCV  --  115*  115*  --  114*   PLT  --  124*  124*  --  147*   INR  --   --   --  1.42*   NA  --  141  --  142   POTASSIUM  --  4.1  --  4.7   CHLORIDE  --  101  --  101   CO2  --  27  --  32*   BUN  --  17.6  --  17.1   CR  --  0.66  --  0.64   ANIONGAP  --  13  --  9   MORRO  --  8.2  --  8.8   GLC 85 81  --  104*   ALBUMIN  --  3.0*  --   --    PROTTOTAL  --  6.6 6.4  --    BILITOTAL  --  0.4  --   --    ALKPHOS  --  120*  --   --    ALT  --  10  --   --    AST  --  27  --   --

## 2022-11-02 NOTE — PROGRESS NOTES
Transfer in from the Emergency room  Patient is Lethargic but opens eyes  When talked too patient on a Oxymask  At 3 L and O2 saturation was 92-94% if the patient keep it in place  On RA she desaturate  To 84%  . Able to eat dinner and ate with good appetite  Able to go down for CT on a wheelchair  With assist of 2 and a transfer belt . Patient is forgetful but responding to questions appropriately.

## 2022-11-02 NOTE — ED PROVIDER NOTES
EMERGENCY DEPARTMENT ENCOUNTER      NAME: Jennifer Rashid  AGE: 89 year old female  YOB: 1932  MRN: [id number]  EVALUATION DATE & TIME: 11/1/2022  8:18 PM    PCP: Manish Weeks    ED PROVIDER: Reynaldo Page M.D.      Chief Complaint   Patient presents with     Shortness of Breath            Generalized Weakness         FINAL IMPRESSION:  1. Pleural effusion    2. Hypoxia          ED COURSE & MEDICAL DECISION MAKING:    Pertinent Labs & Imaging studies reviewed. (See chart for details)  89 year old female presents to the Emergency Department for evaluation of shortness of breath, fatigue. Patient appears non toxic with stable vitals signs, patient is slightly tachycardic but afebrile with no hypoxia but is on supplemental oxygen, did note increased respiratory rate.  Exam significant for distant breath sounds without crackles or wheezing.  Per triage report patient was hypoxic out of triage and placed on supplemental oxygen, see saturating well here.  Review the medical record shows that patient has been admitted in the past for large pleural effusion and here concern for again large pleural effusion, considered also PE, malignancy, less likely ACS, nothing to suggest dissection or esophageal rupture.  No fevers or productive cough, low suspicion for pneumonia or COVID.  We will obtain screening labs and CT imaging of the chest.  Patient was kept on continuous supplemental oxygen.    Reassessment: Labs significant for slightly elevated troponin, did no elevated BNP.  Did no elevated white blood cell count but again no fever here.  COVID, influenza and RSV negative.  CT imaging concerning for mild to moderate increase in the size of loculated left pleural effusion but otherwise reports no dissection or PE, no aneurysm, no report of infectious process.  Suspect her pleural effusion is causing her hypoxia and feels she would benefit from admission for thoracentesis and continue supplemental  oxygen.  Discussed these findings recommendations with the patient who is in agreement.  Discussed care plan to admitting service.    Medical Decision Making    Supplemental history from: N/A    External Record(s) Reviewed: Inpatient Record    Differential Diagnosis: See MDM charting for differential considered.     I performed an independent interpretation of the: EKG: See my documentation.    Discussed with radiology regarding test interpretation: N/A    Discussion of management with another provider: See ED Course and Hospitalist    The following testing was considered but ultimately not selected: None     I considered prescription management with: N/A    The patient's care impacted: None    Consideration of Admission/Observation: N/A - Patient admitted    Care significantly affected by Social Determinants of Health including: N/A    8:28 PM I met with the patient and performed my initial interview and exam   9:08 PM I updated patient with lab and imaging results and plan for admission  9:22 PM I discussed the patient with Dr. Jhaveri from the hospitalist service who agrees to admit the patient.      At the conclusion of the encounter I discussed the results of all of the tests and the disposition. The questions were answered and return precautions provided. The patient or family acknowledged understanding and was agreeable with the care plan.         MEDICATIONS GIVEN IN THE EMERGENCY:  Medications   acetaminophen (TYLENOL) tablet 650 mg (has no administration in time range)   benzonatate (TESSALON) capsule 100 mg (has no administration in time range)   bisacodyl (DULCOLAX) suppository 10 mg (has no administration in time range)   bisacodyl (DULCOLAX) EC tablet 5 mg (has no administration in time range)   alum & mag hydroxide-simethicone (MAALOX) suspension 30 mL (has no administration in time range)   guaiFENesin (ROBITUSSIN) 20 mg/mL solution 10 mL (has no administration in time range)   melatonin tablet 3  mg (has no administration in time range)   magnesium hydroxide (MILK OF MAGNESIA) suspension 30 mL (has no administration in time range)   senna-docusate (SENOKOT-S/PERICOLACE) 8.6-50 MG per tablet 1 tablet (has no administration in time range)   benzocaine-menthol (CEPACOL) 15-3.6 MG lozenge 1 lozenge (has no administration in time range)   sodium chloride (PF) 0.9% PF flush 10-20 mL (has no administration in time range)   sodium chloride (PF) 0.9% PF flush 10-20 mL (has no administration in time range)   sodium chloride (PF) 0.9% PF flush 10-20 mL (has no administration in time range)   heparin lock flush 10 UNIT/ML injection 5-10 mL (has no administration in time range)   heparin lock flush 10 UNIT/ML injection 5-10 mL (has no administration in time range)   heparin 100 UNIT/ML injection 5-10 mL (5 mLs Intracatheter Not Given 11/2/22 0012)   Azelastine HCl SOLN 1 spray (has no administration in time range)   escitalopram (LEXAPRO) tablet 5 mg (has no administration in time range)   folic acid (FOLVITE) tablet 1,000 mcg (has no administration in time range)   levothyroxine (SYNTHROID/LEVOTHROID) tablet 88 mcg (has no administration in time range)   metoprolol succinate ER (TOPROL XL) 24 hr tablet 25 mg (has no administration in time range)   mirtazapine (REMERON) tablet 15 mg (15 mg Oral Not Given 11/2/22 0013)   iopamidol (ISOVUE-370) solution 95 mL (95 mLs Intravenous Given 11/1/22 2048)   furosemide (LASIX) injection 40 mg (40 mg Intravenous Given 11/1/22 2318)       NEW PRESCRIPTIONS STARTED AT TODAY'S ER VISIT  New Prescriptions    No medications on file            =================================================================    HPI    Patient information was obtained from: Patient     Use of Intrepreter: N/A        Jennifer Rashid is a 89 year old female who presents with diarrhea and shortness of breath     Patient reports they have had diarrhea for 3 days and shortness of breath for a week. This  has resulted in increasing weakness. The patient is on oxygen right now and notes that it makes them feel better. The patient is not normally on oxygen at home. The patient notes that they are feeling similar as to when they has fluid on their lungs and required thoracentesis. Patient reports a history of lung cancer and is currently in chemotherapy. The patient denies vomiting, chest pain, cough, or abdominal pain.      REVIEW OF SYSTEMS   Constitutional:  Denies fever, chills  Respiratory:  Denies productive cough or increased work of breathing  Cardiovascular:  Denies chest pain, palpitations  GI:  Denies abdominal pain, nausea, vomiting, or change in bowel or bladder habits   Musculoskeletal:  Denies any new muscle/joint swelling  Skin:  Denies rash   Neurologic:  Denies focal weakness  All systems negative except as marked.     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     BIOPSY BREAST Bilateral     benign     IR CHEST PORT PLACEMENT > 5 YRS OF AGE  10/9/2018     THYROIDECTOMY, PARTIAL       US THORACENTESIS  1/12/2021     US THORACENTESIS  3/16/2021     US THORACENTESIS  4/9/2021     US THORACENTESIS  4/27/2021         CURRENT MEDICATIONS:    Prior to Admission medications    Not on File        ALLERGIES:  Allergies   Allergen Reactions     Penicillins Rash       FAMILY HISTORY:  Family History   Problem Relation Age of Onset     Colon Cancer Son 45.00     Breast Cancer No family hx of        SOCIAL HISTORY:   Social History     Socioeconomic History     Marital status:    Tobacco Use     Smoking status: Never     Smokeless tobacco: Never   Substance and Sexual Activity     Alcohol use: Yes     Comment: Alcoholic Drinks/day: Occasional glass of  wine every several weeks     Drug use: No       VITALS:  Patient Vitals for the past 24 hrs:   BP Temp Pulse Resp SpO2 Weight   11/01/22 2317 -- -- -- -- 92 % --   11/01/22 2300 -- -- -- -- (!) 86 % --   11/01/22 2101 -- -- -- -- 98 % --   11/01/22 2051 (!) 140/71 -- 101 27 100 % --   11/01/22 1840 (!) 153/91 98  F (36.7  C) 111 (!) 32 94 % 43.5 kg (96 lb)        PHYSICAL EXAM    Constitutional:  Awake, alert, appears thin and frail  HENT:  Normocephalic, Atraumatic. Bilateral external ears normal. Oropharynx moist. Nose normal. Neck- Normal range of motion with no guarding, No midline cervical tenderness, Supple, No stridor.   Eyes:  PERRL, EOMI with no signs of entrapment, Conjunctiva normal, No discharge.   Respiratory: Distant breath sounds on the left with no wheezing or crackles, increased respiratory rate  Cardiovascular: Mild tachycardia, Normal rhythm, No appreciable rubs or gallops.   GI:  Soft, No tenderness, No distension, No palpable masses  Musculoskeletal:  Intact distal pulses, No edema. Good range of motion in all major joints. No tenderness to palpation or major deformities noted.  Integument:  Warm, Dry, No erythema, No rash.   Neurologic:  Alert & oriented, Normal motor function, Normal sensory function, No focal deficits noted.   Psychiatric:  Affect normal, Judgment normal, Mood normal.     LAB:  All pertinent labs reviewed and interpreted.  Results for orders placed or performed during the hospital encounter of 11/01/22   CT Chest Pulmonary Embolism w Contrast    Impression    IMPRESSION:  1.  Since 09/23/2022 PET/CT, there has been a mild to moderate increase in size of the loculated left pleural effusion causing an increase in the left lower lobe atelectasis. There has been an increase in atelectatic changes in the right lung most marked   in the right lower lobe.    2.  No PE, dissection, or aneurysm.    3.  Changes of known neoplasm and sequelae from neoplastic treatments.   Result Value  Ref Range    INR 1.42 (H) 0.85 - 1.15   Partial thromboplastin time   Result Value Ref Range    aPTT 32 22 - 38 Seconds   Basic metabolic panel   Result Value Ref Range    Sodium 142 136 - 145 mmol/L    Potassium 4.7 3.4 - 5.3 mmol/L    Chloride 101 98 - 107 mmol/L    Carbon Dioxide (CO2) 32 (H) 22 - 29 mmol/L    Anion Gap 9 7 - 15 mmol/L    Urea Nitrogen 17.1 8.0 - 23.0 mg/dL    Creatinine 0.64 0.51 - 0.95 mg/dL    Calcium 8.8 8.2 - 9.6 mg/dL    Glucose 104 (H) 70 - 99 mg/dL    GFR Estimate 83 >60 mL/min/1.73m2   Result Value Ref Range    Troponin T, High Sensitivity 19 (H) <=14 ng/L   Symptomatic; Unknown Influenza A/B & SARS-CoV2 (COVID-19) Virus PCR Multiplex Nasopharyngeal    Specimen: Nasopharyngeal; Swab   Result Value Ref Range    Influenza A PCR Negative Negative    Influenza B PCR Negative Negative    RSV PCR Negative Negative    SARS CoV2 PCR Negative Negative   CBC with platelets and differential   Result Value Ref Range    WBC Count 19.7 (H) 4.0 - 11.0 10e3/uL    RBC Count 3.39 (L) 3.80 - 5.20 10e6/uL    Hemoglobin 11.9 11.7 - 15.7 g/dL    Hematocrit 38.6 35.0 - 47.0 %     (H) 78 - 100 fL    MCH 35.1 (H) 26.5 - 33.0 pg    MCHC 30.8 (L) 31.5 - 36.5 g/dL    RDW 18.9 (H) 10.0 - 15.0 %    Platelet Count 147 (L) 150 - 450 10e3/uL   Result Value Ref Range    Troponin T, High Sensitivity 17 (H) <=14 ng/L   Nt probnp inpatient (BNP)   Result Value Ref Range    N terminal Pro BNP Inpatient 3,214 (H) 0 - 1,800 pg/mL   TSH with free T4 reflex   Result Value Ref Range    TSH 0.90 0.30 - 4.20 uIU/mL   Manual Differential   Result Value Ref Range    % Neutrophils 96 %    % Lymphocytes 1 %    % Monocytes 2 %    % Eosinophils 1 %    % Basophils 0 %    Absolute Neutrophils 18.9 (H) 1.6 - 8.3 10e3/uL    Absolute Lymphocytes 0.2 (L) 0.8 - 5.3 10e3/uL    Absolute Monocytes 0.4 0.0 - 1.3 10e3/uL    Absolute Eosinophils 0.2 0.0 - 0.7 10e3/uL    Absolute Basophils 0.0 0.0 - 0.2 10e3/uL    RBC Morphology Confirmed RBC  Indices     Platelet Assessment  Automated Count Confirmed. Platelet morphology is normal.     Automated Count Confirmed. Platelet morphology is normal.   Result Value Ref Range    Lactate Dehydrogenase 380 (H) 0 - 250 U/L   Result Value Ref Range    Protein Total 6.4 6.4 - 8.3 g/dL       RADIOLOGY:  CT Chest Pulmonary Embolism w Contrast   Final Result   IMPRESSION:   1.  Since 09/23/2022 PET/CT, there has been a mild to moderate increase in size of the loculated left pleural effusion causing an increase in the left lower lobe atelectasis. There has been an increase in atelectatic changes in the right lung most marked    in the right lower lobe.      2.  No PE, dissection, or aneurysm.      3.  Changes of known neoplasm and sequelae from neoplastic treatments.      US Thoracentesis    (Results Pending)          EKG:    Sinus tachycardia, no specific ST acute ischemic changes, no concerning dysrhythmias or interval prolongation, compared to ECG of April 8, 2021, no specific ST acute ischemic changes appreciated  I have independently reviewed and interpreted the EKG(s) documented above.    PROCEDURES:          I, Margarette Howe, am serving as a scribe to document services personally performed by Reynaldo Page MD, based on my observation and the provider's statements to me. I, Reynaldo Page MD attest that Margarette Howe is acting in a scribe capacity, has observed my performance of the services and has documented them in accordance with my direction.    Reynaldo Page M.D.  Emergency Medicine  [facility] EMERGENCY DEPARTMENT  [address] [place] [id number] [phone]  Dept: [phone]     Reynaldo Page MD  11/02/22 0041

## 2022-11-02 NOTE — PROGRESS NOTES
Pharmacy Note - Admission Medication History     ______________________________________________________________________    Prior To Admission (PTA) med list completed and updated in EMR.       PTA Med List   Medication Sig Note Last Dose     Azelastine HCl 137 MCG/SPRAY SOLN Spray 1 spray in nostril daily  11/1/2022     escitalopram (LEXAPRO) 5 MG tablet Take 5 mg by mouth daily  11/1/2022     folic acid (FOLVITE) 1 MG tablet Take 1,000 mcg by mouth daily  11/1/2022     levothyroxine (SYNTHROID/LEVOTHROID) 88 MCG tablet Take 88 mcg by mouth daily before breakfast  11/1/2022     metoprolol succinate ER (TOPROL XL) 50 MG 24 hr tablet Take 25 mg by mouth daily 11/1/2022: 1/2 tablet 11/1/2022     mirtazapine (REMERON) 15 MG tablet Take 15 mg by mouth At Bedtime  10/31/2022     sennosides (SENOKOT) 8.6 MG tablet Take 8.6 mg by mouth 2 times daily as needed for constipation  Unknown at prn       Information source(s): Patient and CareEverywhere[facility]  Method of interview communication: in-person    Summary of Changes to PTA Med List  New: all    Patient was asked about OTC/herbal products specifically.  PTA med list reflects this.    In the past week, patient estimated taking medication this percent of the time:  greater than 90%.    Allergies were reviewed, assessed, and updated with the patient.      Patient did not bring any medications to the hospital and can't retrieve from home. No multi-dose medications are available for use during hospital stay.     The information provided in this note is only as accurate as the sources available at the time of the update(s).    Thank you,  Georgette Salvador, [facility]  11/1/2022 10:57 PM

## 2022-11-02 NOTE — H&P
Admission History and Physical   Jennifer Rashid, 4/7/1932, [id number]  [facility]  Hypoxia [R09.02]  PCP:Manish Weeks, [phone]   Admitting provider: Faviola Jhaveri MD.    Code status:  Full Code          Extended Emergency Contact Information  Primary Emergency Contact: Salvatore Rashid  Address: [address] [place] [address] [facility]  Home Phone: [phone]  Mobile Phone: [phone]  Relation: Spouse       Assessment and Plan  Principal Problem:    Hypoxia  Active Problems:    Benign essential hypertension    Non-small cell lung cancer (NSCLC) (H)    Hypothyroidism    Pleural effusion    Acute respiratory failure with hypoxia (H)    Leukocytosis, unspecified type    Thrombocytopenia (H)    Jennifer Rashid is a 89 year old female presenting with sob and recurrent pleural effusion.     Acute respiratory failure secondary to recurrent pleural effusion   - per patient she gets recurrent pleural effusions every couple of weeks  - continue supp O2 to keep sats >92%  - give IV lasix 40mg x1 to see if any benefit and has BLE edema   - US thoracentesis for am and send fluid for studies  - oncology consult     H/O NSCLC  - currently undergoing chemotherapy   - port in place and accessed  - oncology consulted     Leukocytosis afeb   - hold on abx for now unless spikes fever low threshold  - trend CBC  - should improve with fluid removal     HTN  - continue home metoprolol   - IV lasix 40mg x 1    Thrombocytopenia   - may be from chemotherapy  - trend     Hypothyroid  - TSH stable  - continue home meds     COVID-19 PCR Results    COVID-19 PCR Results 3/12/21 4/8/21 4/9/21 4/23/21 6/14/21 11/23/21   SARS-CoV-2 Virus Specimen Source Nasopharyngeal Nasopharyngeal  Nasopharyngeal Nasopharyngeal    SARS-CoV-2 PCR Result NEGATIVE NEGATIVE Negative NEGATIVE NEGATIVE    SARS CoV2 PCR      Negative      Comments are available for some flowsheets but are not being displayed.          COVID-19 Antibody Results, Testing for Immunity    COVID-19 Antibody Results, Testing for Immunity   No data to display.           VTE prophylaxis:  Pneumatic Compression Devices  DIET: Orders Placed This Encounter      Regular Diet Adult    Drains/Lines: port   Weight bearing status: WBAt  Disposition/Barriers to discharge: pending improvement and oncology further recs   Code Status:Full Code    HPI: Jennifer Rashid is a 89 year old old female with h/o right lung cancer on chemotherapy, HTN and recurrent pleural effusion. Patient reports they have had diarrhea for 3 days and shortness of breath for a week. This has resulted in increasing weakness. The patient is not normally on oxygen at home. The patient denies fevers, chills, vomiting, chest pain, cough, or abdominal pain.    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     BIOPSY BREAST Bilateral     benign     IR CHEST PORT PLACEMENT > 5 YRS OF AGE  10/9/2018     THYROIDECTOMY, PARTIAL       US THORACENTESIS  1/12/2021     US THORACENTESIS  3/16/2021     US THORACENTESIS  4/9/2021     US THORACENTESIS  4/27/2021     Family History   Problem Relation Age of Onset     Colon Cancer Son 45.00     Breast Cancer No family hx of      Social History     Socioeconomic History     Marital status:      Spouse name: Not on file     Number of children: Not on file     Years of education: Not on file     Highest education level: Not on file   Occupational History     Not on file   Tobacco Use     Smoking status: Never     Smokeless tobacco: Never   Substance and Sexual Activity     Alcohol use: Yes     Comment: Alcoholic Drinks/day: Occasional glass of wine every several weeks     Drug use: No     Sexual activity: Not on file   Other Topics Concern      Not on file   Social History Narrative     Not on file     Social Determinants of Health     Financial Resource Strain: Not on file   Food Insecurity: Not on file   Transportation Needs: Not on file   Physical Activity: Not on file   Stress: Not on file   Social Connections: Not on file   Intimate Partner Violence: Not on file   Housing Stability: Not on file     Allergies   Allergen Reactions     Penicillins Rash       PRIOR TO ADMISSION MEDICATIONS   Prior to Admission medications    Not on File        REVIEW OF SYSTEMS:  12 point reviewed pertinent negatives and positives in HPI all others negative     PHYSICAL EXAM  Temp:  [98  F (36.7  C)] 98  F (36.7  C)  Pulse:  [101-111] 101  Resp:  [27-32] 27  BP: (140-153)/(71-91) 140/71  SpO2:  [94 %-100 %] 98 %  Wt Readings from Last 1 Encounters:   11/01/22 43.5 kg (96 lb)     Body mass index is 16.48 kg/m .  Physical Exam  Constitutional:       Appearance: She is ill-appearing.      Comments: Frail elderly lady    HENT:      Head: Normocephalic.      Comments: Temporal wasting  Eyes:      Extraocular Movements: Extraocular movements intact.      Conjunctiva/sclera: Conjunctivae normal.      Pupils: Pupils are equal, round, and reactive to light.   Cardiovascular:      Rate and Rhythm: Regular rhythm. Tachycardia present.      Pulses: Normal pulses.      Comments: REED  Pulmonary:      Comments: Tachypnea,   Coarse BS right lung base  Left lung BS at 1/3 up and rhonchi, crackles   Abdominal:      Palpations: Abdomen is soft.   Musculoskeletal:         General: Normal range of motion.      Comments: BLE edema   Skin:     General: Skin is warm and dry.      Capillary Refill: Capillary refill takes less than 2 seconds.   Neurological:      General: No focal deficit present.      Mental Status: She is alert and oriented to person, place, and time. Mental status is at baseline.         PERTINENT LABS and RADIOLOGY   Results for orders placed or performed during the  hospital encounter of 11/01/22   CT Chest Pulmonary Embolism w Contrast    Impression    IMPRESSION:  1.  Since 09/23/2022 PET/CT, there has been a mild to moderate increase in size of the loculated left pleural effusion causing an increase in the left lower lobe atelectasis. There has been an increase in atelectatic changes in the right lung most marked   in the right lower lobe.    2.  No PE, dissection, or aneurysm.    3.  Changes of known neoplasm and sequelae from neoplastic treatments.       Recent Labs   Lab 11/01/22 2114 11/01/22  1849   WBC  --  19.7*   HGB  --  11.9   MCV  --  114*   PLT  --  147*   INR  --  1.42*   NA  --  142   POTASSIUM  --  4.7   CHLORIDE  --  101   CO2  --  32*   BUN  --  17.1   CR  --  0.64   ANIONGAP  --  9   MORRO  --  8.8   GLC  --  104*   PROTTOTAL 6.4  --      EKG:sinus rhythm and PVC       Faviola Jhaveri MD  [facility] Medicine Service  [phone]

## 2022-11-02 NOTE — PLAN OF CARE
Problem: Plan of Care - These are the overarching goals to be used throughout the patient stay.    Goal: Plan of Care Review  Description: The Plan of Care Review/Shift note should be completed every shift.  The Outcome Evaluation is a brief statement about your assessment that the patient is improving, declining, or no change.  This information will be displayed automatically on your shift note.  Outcome: Progressing     Problem: Plan of Care - These are the overarching goals to be used throughout the patient stay.    Goal: Absence of Hospital-Acquired Illness or Injury  Intervention: Identify and Manage Fall Risk  Recent Flowsheet Documentation  Taken 11/2/2022 0400 by Saadia Abernathy RN  Safety Promotion/Fall Prevention:   activity supervised   assistive device/personal items within reach   fall prevention program maintained   nonskid shoes/slippers when out of bed   patient and family education   room near nurse's station  Taken 11/1/2022 2330 by Saadia Abernathy RN  Safety Promotion/Fall Prevention:   activity supervised   assistive device/personal items within reach   fall prevention program maintained   nonskid shoes/slippers when out of bed   patient and family education   room near nurse's station   Goal Outcome Evaluation:    Pt maintains O2 sat on 4l via oxymask. Desats to high 70s when removes O2. Ambulated to bathroom with sba. SOB noted with exertion. Pt had a large incontinent void and has been unable to collect urine specimen. Denies pain. Impulsive at times, attempts to get out of bed independently.

## 2022-11-02 NOTE — ED NOTES
Bed: JNEDP-04  Expected date:   Expected time:   Means of arrival:   Comments:  Boardamanda CONTE HW 5 M.S.

## 2022-11-02 NOTE — PROGRESS NOTES
"Consult received, discussed request with Dr Jose EDGE.  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    Had brief visit this afternoon at 1635 with Pt--she  affirmed desire for CPR in case of cardiac arrest; unclear to me if she retains decisional capacity due to encephalopathy, she is lethargic, and unable to express understanding of where things are at with her cancer.  HCD was located in [facility] version of Epic and uploaded.  Please review; I called  Seymour and LMTCB and also called son Eugenio (Dr Rashid, a dermatologist) and LMTCB.   Palliative will complete consult tomorrow morning.    It is stated in her HCD that if she had a terminal illness, Jennifer would not wish to undergo CPR.  Recommend that if clinical decline, continue efforts to contact family.  Would want to verify their understanding of her HCD as HCA prior to changing code status.    /65 (BP Location: Right arm, Patient Position: Supine, Cuff Size: Adult Small)   Pulse 88   Temp 97.8  F (36.6  C) (Oral)   Resp 18   Ht 1.626 m (5' 4\")   Wt 43.5 kg (96 lb)   SpO2 95%   BMI 16.48 kg/m    Cachectic 89 yo female, profound sarcopenia, temporal muscle wasting.  In bed w oxymask, at times taking it off  Lungs CTA, tachypneic with mild accessory muscle use.  Cor S1S2   abd scaphoid, nontender.  extr without mottling.    Liza Rai MD  [facility] Palliative Medicine Service: [facility]  Department voice mail (checked M-F 8a-4pm approx): [phone]  [facility] Palliative Medicine pager: [phone]  (Please use AMION for text paging if possible, use link under Palliative service)  May page me directly via AMION      "

## 2022-11-02 NOTE — PLAN OF CARE
Problem: Gas Exchange Impaired  Goal: Optimal Gas Exchange  Outcome: Progressing   Goal Outcome Evaluation:         Patient on 4L oxymask with sats in mid 90's.  Had thoracentesis this morning.  Pt lethargic, opens eyes to voice.  Patient swallowed meds well with a sip of water.  Ate a few spoonfuls of yogurt, a couple bites of eggs and drank 1 orange juice.  Repositioned side to side with pillows every 2-3 hours.  Incontinent of urine.

## 2022-11-03 NOTE — PROGRESS NOTES
Attempted flutter valve therapy for a second time, pt sleepy not able to participate in therapy at this time.  RT will continue to follow.     Rehana Ayon, RT on 11/2/2022 at 8:50 PM

## 2022-11-03 NOTE — PROGRESS NOTES
[facility]    Medicine Progress Note - Hospitalist Service    Date of Admission:  11/1/2022    Assessment & Plan   Jennifer Rashid is a 89 year old female with past medical history of recurrent lung cancer, recurrent right pleural effusion requiring thoracocentesis, HTN who presented to ED for evaluation of shortness of breath, found to have recurrent pleural effusion and admitted for further management.       Acute respiratory failure secondary to recurrent left pleural effusion;  Recurrent lung cancer;  --On admission CT chest negative for PE but reported Since 09/23/2022 PET/CT, there has been a mild to moderate increase in size of the loculated left pleural effusion causing an increase in the left lower lobe atelectasis. There has been an increase in atelectatic changes in the right lung most marked   in the right lower lobe.  --On admission required 4 L O2, after thoracocentesis down to 2-3 L.  Titrate O2 as able.   --Incentive spirometry, flutter valve but patient unable to do due to sleepiness.  Added saline nebulizer and DuoNeb for mucous plugging seen in CAT scan.  --Follow-up on pleural fluid studies--so far no organisms seen  --Oncology consulted and awaiting input    Leukocytosis; likely multifactorial-possibly due to underlying cancer, cannot rule out pneumonia given  shortness of breath and CT findings.  --No reported fever.  --Check procalcitonin--mildly elevated at 0.14  --Started antibiotics Rocephin and azithromycin, continue  -- Aspiration precaution    Acute metabolic encephalopathy; likely multifactorial  -- CT head negative for acute intracranial process  -- Currently on one-to-one observation  -- Continue clinical monitoring  -- Avoid sedatives    Mild lactic acidosis; uncertain significance  --Recheck trending down despite no intervention.  Hemodynamically stable, afebrile.  Hesitant to give bolus IV fluid given recurrent pleural effusion, elevated BNP.  --On  "antibiotics for possible pneumonia    Essential hypertension;  -- PTA metoprolol    Chronic thrombocytopenia; likely from chemotherapy  --Trend    Hypothyroidism;  -- TSH normal, continue home Synthyroid    Goal of care; palliative care consulted and discussed with palliative care provider.    DVT prophylaxis; subcu Lovenox         Diet: Regular Diet Adult    DVT Prophylaxis: Enoxaparin (Lovenox) SQ  Upton Catheter: Not present  Central Lines: None  Cardiac Monitoring: None  Code Status: Full Code      Disposition Plan     Expected Discharge Date: 11/04/2022                The patient's care was discussed with the Bedside Nurse, Care Coordinator/ and Plan to discussed with palliative care later today.    Jose EDGE MD  Hospitalist Service  [facility]  Securely message with the Vocera Web Console (learn more here)  Text page via [employer] Paging/Directory         Clinically Significant Risk Factors              # Hypoalbuminemia: Lowest albumin = 3 g/dL (Ref range: 3.5-5.2) at 11/2/2022  8:20 AM, will monitor as appropriate   # Thrombocytopenia: Lowest platelets = 100 (Ref range: 150-450) in last 2 days, will monitor for bleeding        # Cachexia: Estimated body mass index is 14.93 kg/m  as calculated from the following:    Height as of this encounter: 1.626 m (5' 4\").    Weight as of this encounter: 39.5 kg (87 lb)., PRESENT ON ADMISSION         ______________________________________________________________________    Interval History   Patient seen and examined.  Notes, labs, imaging report personally reviewed.  Overnight event reviewed, patient trying to pull tubes and lines, reported mittens not helping, now on one-to-one observation.  Patient sleepy but arousable, follows simple commands appropriately.  Patient able to tell full name, date of birth notes she is at [facility].  Patient denied short of breath, denied pain.  Patient's nurse at bedside.    Data reviewed " today: I reviewed all medications, new labs and imaging results over the last 24 hours. I personally reviewed no images or EKG's today.    Physical Exam   Vital Signs: Temp: 97.7  F (36.5  C) Temp src: Axillary BP: 130/77 Pulse: 83   Resp: 18 SpO2: 94 % O2 Device: Oxymask Oxygen Delivery: 3 LPM  Weight: 87 lbs 0 oz    General: Not in obvious distress.  Cachectic  HEENT: Normocephalic, supple neck  Chest: Clear to auscultation bilateral anteriorly, decreased breath sounds on left lung field with crackles  Heart: S1S2 normal, regular  Abdomen: Soft. NT, ND. Bowel sounds- active.  Extremities: No legs swelling  Neuro: alert and awake, grossly non-focal      Data   Recent Labs   Lab 11/03/22  0456 11/02/22  1312 11/02/22  0820 11/01/22  2114 11/01/22  1849   WBC 10.7  --  14.2*  14.2*  --  19.7*   HGB 11.2*  --  11.8  11.8  --  11.9   *  --  115*  115*  --  114*   *  --  124*  124*  --  147*   INR  --   --   --   --  1.42*   NA  --   --  141  --  142   POTASSIUM  --   --  4.1  --  4.7   CHLORIDE  --   --  101  --  101   CO2  --   --  27  --  32*   BUN  --   --  17.6  --  17.1   CR  --   --  0.66  --  0.64   ANIONGAP  --   --  13  --  9   MORRO  --   --  8.2  --  8.8   GLC  --  85 81  --  104*   ALBUMIN  --   --  3.0*  --   --    PROTTOTAL  --   --  6.6 6.4  --    BILITOTAL  --   --  0.4  --   --    ALKPHOS  --   --  120*  --   --    ALT  --   --  10  --   --    AST  --   --  27  --   --

## 2022-11-03 NOTE — PROVIDER NOTIFICATION
RCAT Treatment Plan    Patient Score: 11    Patient Acuity: 3    Clinical Indication for Therapy: Atelectasis, pleural effusion     Therapy Ordered:  TID Duo, and Normal saline     Assessment Summary: Pt appears to be sleepy and semi-lethargic but shows no obvious respiratory distress at this time. BS: highly diminished @ bases but able to move good aerations. Pt is very weak and unable to do Flutter haven or communicate effectively. Pt did receive her scheduled Neb tx as ordered. Continue current plan of care.       Jing Maciel, RRT  11/3/2022

## 2022-11-03 NOTE — PLAN OF CARE
"Assumed care 1900 to 0730. A&O x 2, disoriented to time & situation. Assist x 1 with cares. Tele is sinus w/ 1st AVB. Denies pain. 3L O2 via oxymask. At the beginning of the shift, patient removed PIV and oxymask. PIV replaced for IV medications. Handmits ordered to prevent pulling at lines and mask. Upon waking at 0640, patient called out of help while attempting to get out of bed. Patient stated, \"Take it off. I want to die.\" 1:1 sitter placed at bedside for patient safety. Call light within reach, able to make needs known. Bed alarm on for safety.    Problem: Gas Exchange Impaired  Goal: Optimal Gas Exchange  Intervention: Optimize Oxygenation and Ventilation  Recent Flowsheet Documentation  Taken 11/3/2022 0400 by LORI BOND  Head of Bed (HOB) Positioning: HOB at 20-30 degrees  Taken 11/2/2022 2355 by LORI BOND  Head of Bed (HOB) Positioning: HOB at 20-30 degrees  Taken 11/2/2022 2030 by LORI BOND  Head of Bed (HOB) Positioning: HOB at 20-30 degrees       "

## 2022-11-03 NOTE — PLAN OF CARE
"  Problem: Plan of Care - These are the overarching goals to be used throughout the patient stay.    Goal: Plan of Care Review  Description: The Plan of Care Review/Shift note should be completed every shift.  The Outcome Evaluation is a brief statement about your assessment that the patient is improving, declining, or no change.  This information will be displayed automatically on your shift note.  Outcome: Progressing  Goal: Patient-Specific Goal (Individualized)  Description: You can add care plan individualizations to a care plan. Examples of Individualization might be:  \"Parent requests to be called daily at 9am for status\", \"I have a hard time hearing out of my right ear\", or \"Do not touch me to wake me up as it startles me\".  Outcome: Progressing  Goal: Absence of Hospital-Acquired Illness or Injury  Outcome: Progressing  Intervention: Identify and Manage Fall Risk  Recent Flowsheet Documentation  Taken 11/3/2022 1611 by Linette Harris RN  Safety Promotion/Fall Prevention: activity supervised  Taken 11/3/2022 1140 by Linette Harris RN  Safety Promotion/Fall Prevention: activity supervised  Taken 11/3/2022 0813 by Linette Harris RN  Safety Promotion/Fall Prevention: activity supervised  Intervention: Prevent Skin Injury  Recent Flowsheet Documentation  Taken 11/3/2022 1611 by Linette Harris RN  Body Position:   left   right   turned  Taken 11/3/2022 1140 by Linette Harris RN  Body Position:   left   right   turned  Taken 11/3/2022 0813 by Linette Harris RN  Body Position:   left   right   turned  Goal: Optimal Comfort and Wellbeing  Outcome: Progressing   Goal Outcome Evaluation:  Off the 1:1 cares and placed on the mittens she still do intermittently remove her mask   but noted that she is more alert  than yesterday   today she was not able to say where she is and do not know the date     no attempt  to get out of  bed she was able to stand at the bedside with assist of 2 and very " unsteady when up was up in the recliner  2 times   ate meals prefers soft food like pudding  and she is a feeder  takes medications  with  pudding tolerating PO intake well .

## 2022-11-03 NOTE — CONSULTS
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  She is currently receiving Pemetrexed 375mg/m2 every 3 weeks. Most recent PET 9/23/22 showed progression to T1 which was planned for palliative radiation this week but unable to proceed due to nausea. She is now admitted for progressive shortness of breath.     Recurrent pleural effusion, suspicious for disease progression. S/p thoracentesis 11/2/22; awaiting cytology.     PLAN:   1. Pending ECHO to evaluate cardiac etiology of SOB.  2. Recommend PET/CT outpatient for restaging.    3. Pending cytology with regards to prognosis. Patient is hospice appropriate.    Patient remains groggy - thought she was at home and only desire was to \"get all this stuff off.\"  present and hopeful that this isn't disease progression; remains hopeful    Traci Monroe  [facility]  Office: [phone]  Cell: [phone] Monday through Friday, 8AM-5PM. After hours and weekends, please use answering service.              Chief Complaint:   Shortness of Breath (/) and Generalized Weakness           History of Present Illness:   This patient is a 89 year old female.  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  Given that there was no actionable mutation on her [employer] 360 testing at the time of metastatic disease, she was initiated on treatment with carboplatin plus pemetrexed on 12/28/2020. She finished 4 cycles, and is on maintenance pemetrexed. Treatment was held on 5/3/21 x 6 weeks for weakness, weight loss, and a syncopal episode, with significant improvement in her appetite, " "weight and energy level. She resumed treatment on 21 at 75% of the full dose, which she has tolerated fairly well. She is currently on 375mg/m2 every 3 weeks.     PET/CT 22 showed T1 lesion - planned for palliative radiation.    22 patient Admitted for shortness of breath and recurrent pleural effusion.     22 s/p thoracentesis-awaiting cytology.         Physical Exam:   Vitals were reviewed  Blood pressure 130/77, pulse 83, temperature 97.7  F (36.5  C), temperature source Axillary, resp. rate 18, height 1.626 m (5' 4\"), weight 39.5 kg (87 lb), SpO2 94 %.  Temperatures:  Current - Temp: 97.7  F (36.5  C); Max - Temp  Av.8  F (36.6  C)  Min: 97.7  F (36.5  C)  Max: 98  F (36.7  C)  Respiration range: Resp  Av  Min: 18  Max: 24  Pulse range: Pulse  Av.4  Min: 83  Max: 95  Blood pressure range: Systolic (24hrs), Av , Min:103 , Max:130   ; Diastolic (24hrs), Av, Min:58, Max:77    Pulse oximetry range: SpO2  Av %  Min: 94 %  Max: 100 %    Intake/Output Summary (Last 24 hours) at 11/3/2022 0920  Last data filed at 11/3/2022 0900  Gross per 24 hour   Intake 1262 ml   Output --   Net 1262 ml     Pending visit this afternoon  GENERAL: No acute distress.   SKIN: No rashes or jaundice.  HEENT: Normocephalic, atraumatic.   HEART: Regular rate and rhythm with no murmurs.  LUNGS: Clear and diminished bilaterally.                Past Medical History:   I have reviewed this patient's past medical history  Past Medical History:   Diagnosis Date     Fibrocystic breast      Hypertension      Non-small cell lung cancer (H)      Thyroid cancer (H)              Past Surgical History:   I have reviewed this patient's past surgical history  Past Surgical History:   Procedure Laterality Date     AORTIC ARCH REPAIR      congenital abnormality     BIOPSY BREAST Bilateral     benign     IR CHEST PORT PLACEMENT > 5 YRS OF AGE  10/9/2018     THYROIDECTOMY, PARTIAL       US THORACENTESIS  " 1/12/2021     US THORACENTESIS  3/16/2021      THORACENTESIS  4/9/2021      THORACENTESIS  4/27/2021               Social History:   I have reviewed this patient's social history  Social History     Tobacco Use     Smoking status: Never     Smokeless tobacco: Never   Substance Use Topics     Alcohol use: Yes     Comment: Alcoholic Drinks/day: Occasional glass of wine every several weeks             Family History:   I have reviewed this patient's family history  Family History   Problem Relation Age of Onset     Colon Cancer Son 45.00     Breast Cancer No family hx of              Allergies:     Allergies   Allergen Reactions     Penicillins Rash             Medications:   I have reviewed this patient's current medications  Medications Prior to Admission   Medication Sig Dispense Refill Last Dose     Azelastine HCl 137 MCG/SPRAY SOLN Spray 1 spray in nostril daily   11/1/2022     escitalopram (LEXAPRO) 5 MG tablet Take 5 mg by mouth daily   11/1/2022     folic acid (FOLVITE) 1 MG tablet Take 1,000 mcg by mouth daily   11/1/2022     levothyroxine (SYNTHROID/LEVOTHROID) 88 MCG tablet Take 88 mcg by mouth daily before breakfast   11/1/2022     metoprolol succinate ER (TOPROL XL) 50 MG 24 hr tablet Take 25 mg by mouth daily   11/1/2022     mirtazapine (REMERON) 15 MG tablet Take 15 mg by mouth At Bedtime   10/31/2022     sennosides (SENOKOT) 8.6 MG tablet Take 8.6 mg by mouth 2 times daily as needed for constipation   Unknown at prn     Current Facility-Administered Medications Ordered in Epic   Medication Dose Route Frequency Last Rate Last Admin     acetaminophen (TYLENOL) tablet 650 mg  650 mg Oral Q4H PRN         alum & mag hydroxide-simethicone (MAALOX) suspension 30 mL  30 mL Oral Q4H PRN         Azelastine HCl SOLN 1 spray  1 spray Nasal Daily   1 spray at 11/03/22 0821     azithromycin (ZITHROMAX) 250 mg in sodium chloride 0.9 % 250 mL intermittent infusion  250 mg Intravenous Q24H         benzocaine-menthol  (CEPACOL) 15-3.6 MG lozenge 1 lozenge  1 lozenge Buccal Q1H PRN         benzonatate (TESSALON) capsule 100 mg  100 mg Oral TID PRN         bisacodyl (DULCOLAX) EC tablet 5 mg  5 mg Oral Daily PRN         bisacodyl (DULCOLAX) suppository 10 mg  10 mg Rectal Daily PRN         cefTRIAXone (ROCEPHIN) 2 g vial to attach to  ml bag for ADULTS or NS 50 ml bag for PEDS  2 g Intravenous Q24H   2 g at 11/02/22 1401     enoxaparin ANTICOAGULANT (LOVENOX) injection 30 mg  30 mg Subcutaneous Q24H   30 mg at 11/02/22 2355     escitalopram (LEXAPRO) tablet 5 mg  5 mg Oral Daily   5 mg at 11/03/22 0820     folic acid (FOLVITE) tablet 1,000 mcg  1,000 mcg Oral Daily   1,000 mcg at 11/03/22 0820     guaiFENesin (ROBITUSSIN) 20 mg/mL solution 10 mL  10 mL Oral Q4H PRN         heparin 100 UNIT/ML injection 5-10 mL  5-10 mL Intracatheter Q28 Days         heparin lock flush 10 UNIT/ML injection 5-10 mL  5-10 mL Intracatheter Q1H PRN         heparin lock flush 10 UNIT/ML injection 5-10 mL  5-10 mL Intracatheter Q24H         ipratropium - albuterol 0.5 mg/2.5 mg/3 mL (DUONEB) neb solution 3 mL  3 mL Nebulization 3 times daily         ipratropium - albuterol 0.5 mg/2.5 mg/3 mL (DUONEB) neb solution 3 mL  3 mL Nebulization Q4H PRN         levothyroxine (SYNTHROID/LEVOTHROID) tablet 88 mcg  88 mcg Oral QAM AC   88 mcg at 11/03/22 0828     magnesium hydroxide (MILK OF MAGNESIA) suspension 30 mL  30 mL Oral Daily PRN         metoprolol succinate ER (TOPROL XL) 24 hr tablet 25 mg  25 mg Oral Daily   25 mg at 11/03/22 0820     [Held by provider] mirtazapine (REMERON) tablet 15 mg  15 mg Oral At Bedtime         senna-docusate (SENOKOT-S/PERICOLACE) 8.6-50 MG per tablet 1 tablet  1 tablet Oral BID PRN         sodium chloride (NEBUSAL) 3 % neb solution 3 mL  3 mL Nebulization TID         sodium chloride (PF) 0.9% PF flush 10-20 mL  10-20 mL Intracatheter q1 min prn         sodium chloride (PF) 0.9% PF flush 10-20 mL  10-20 mL Intracatheter Q1H  PRN         sodium chloride (PF) 0.9% PF flush 10-20 mL  10-20 mL Intracatheter Q28 Days         No current [facility]-ordered outpatient medications on file.             Review of Systems:     The 10 point Review of Systems is negative other than noted in the HPI.            Data:   Data   Results for orders placed or performed during the hospital encounter of 11/01/22 (from the past 24 hour(s))   US Thoracentesis    Narrative    EXAM:   1. LEFT THORACENTESIS  2. ULTRASOUND GUIDANCE  LOCATION: [facility]  DATE/TIME: 11/2/2022 10:03 AM    INDICATION: Pleural effusion.    PROCEDURE: Informed consent obtained. Time out performed. The chest was prepped and draped in sterile fashion. 10 mL of 1 % lidocaine was infused into the local soft tissues. Under direct ultrasound guidance, a 5 Maltese catheter system was placed into   the pleural effusion.     0.8 liters of reddish fluid were removed and sent to lab, if requested.    Patient tolerated procedure well.    Ultrasound imaging was obtained and placed in the patient's permanent medical record.      Impression    IMPRESSION:  Status post left ultrasound-guided thoracentesis.    Reference CPT Code: 85291   Cell count with differential fluid    Narrative    The following orders were created for panel order Cell count with differential fluid.  Procedure                               Abnormality         Status                     ---------                               -----------         ------                     Cell Count Body Fluid[[id number]]        Abnormal            Final result               Differential Body Fluid[[id number]]                          Final result                 Please view results for these tests on the individual orders.   Pleural fluid Aerobic Bacterial Culture Routine with Gram Stain    Specimen: Pleural Cavity, Left; Pleural fluid   Result Value Ref Range    Gram Stain Result No organisms seen     Gram Stain Result 1+ WBC  seen    Glucose fluid   Result Value Ref Range    Glucose Fluid Source Pleural Cavity, Left     Glucose fluid 91 mg/dL    Narrative    No reference ranges have been established. This result should be interpreted in the context of the patient's clinical condition and compared to simultaneous measurement in the patient's blood. This is a lab developed test. It has not been cleared or approved by the FDA. FDA clearance is not required for clinical use.   Lactate dehydrogenase fluid   Result Value Ref Range    LD Fluid Source Pleural Cavity, Left     Lactate dehydrogenase fluid 169 U/L    Narrative    No reference ranges have been established. This result should be interpreted in the context of the patient's clinical condition and compared to simultaneous measurement in the patient's blood. This is a lab developed test. It has not been cleared or approved by the FDA. FDA clearance is not required for clinical use.   Protein fluid   Result Value Ref Range    Protein Fluid Source Pleural Cavity, Left     Protein Total Fluid 4.0 g/dL    Narrative    No reference ranges have been established. This result should be interpreted in the context of the patient's clinical condition and compared to simultaneous measurement in the patient's blood. This is a lab developed test. It has not been cleared or approved by the FDA. FDA clearance is not required for clinical use.   Cell Count Body Fluid   Result Value Ref Range    Color Red (A) Colorless, Yellow    Clarity Turbid (A) Clear    Total Nucleated Cells 590 /uL    RBC Count 43,000 /uL    Cell Count Fluid Source Pleural Cavity, Left     Narrative    No reference ranges have been established.  This result  should be interpreted in the context of the patient's clinical condition and   compared to simultaneous measurement in the patient's blood.        Small clot present, count may be inaccurate.   Differential Body Fluid   Result Value Ref Range    % Neutrophils 16 %    %  Lymphocytes 77 %    % Monocyte/Macrophages 6 %    % Eosinophils 1 %    Narrative    No reference ranges have been established. This result should be interpreted in the context of the patient's clinical condition and compared to simultaneous measurement in the patient's blood.   Lactic Acid STAT   Result Value Ref Range    Lactic Acid 2.4 (H) 0.7 - 2.0 mmol/L   Extra Tube *Canceled*    Narrative    The following orders were created for panel order Extra Tube.  Procedure                               Abnormality         Status                     ---------                               -----------         ------                     Extra Purple Top Tube[[id number]]                                                         Please view results for these tests on the individual orders.   Glucose by meter   Result Value Ref Range    GLUCOSE BY METER POCT 85 70 - 99 mg/dL   Lactic acid whole blood   Result Value Ref Range    Lactic Acid 2.1 (H) 0.7 - 2.0 mmol/L   CT Head w/o Contrast    Narrative    EXAM: CT HEAD W/O CONTRAST  LOCATION: [facility]  DATE/TIME: 11/2/2022 6:43 PM    INDICATION: sleepy, hx of recurrent lung cancer  COMPARISON: MRI brain 06/27/2022  TECHNIQUE: Routine CT Head without IV contrast. Multiplanar reformats. Dose reduction techniques were used.    FINDINGS:  INTRACRANIAL CONTENTS: No intracranial hemorrhage, extraaxial collection, or mass effect.  No CT evidence of acute infarct. Moderate presumed chronic small vessel ischemic changes. Moderate generalized volume loss. No hydrocephalus. There is intracranial   atherosclerosis.     VISUALIZED ORBITS/SINUSES/MASTOIDS: Prior bilateral cataract surgery. Visualized portions of the orbits are otherwise unremarkable. No paranasal sinus mucosal disease. No middle ear or mastoid effusion.    BONES/SOFT TISSUES: No acute abnormality.      Impression    IMPRESSION:  1.  No CT evidence for acute intracranial process. Please note evaluation  for metastatic disease is limited given the lack of intravenous contrast.  2.  Brain atrophy and presumed chronic microvascular ischemic changes as above.   Magnesium   Result Value Ref Range    Magnesium 2.5 (H) 1.7 - 2.3 mg/dL   CBC with platelets   Result Value Ref Range    WBC Count 10.7 4.0 - 11.0 10e3/uL    RBC Count 3.22 (L) 3.80 - 5.20 10e6/uL    Hemoglobin 11.2 (L) 11.7 - 15.7 g/dL    Hematocrit 37.2 35.0 - 47.0 %     (H) 78 - 100 fL    MCH 34.8 (H) 26.5 - 33.0 pg    MCHC 30.1 (L) 31.5 - 36.5 g/dL    RDW 18.8 (H) 10.0 - 15.0 %    Platelet Count 100 (L) 150 - 450 10e3/uL

## 2022-11-03 NOTE — SIGNIFICANT EVENT
Significant Event Note    Time of event: 9:40 PM November 2, 2022    Description of event:  Notified by nursing of patient continuously removing her oxygen, pulling at lines and removing IVs. Assessed patient; she remains sleepy but oriented, reminded her the importance of keeping her oxygen on.    Plan:  Hand mitts ordered    Discussed with: bedside nurse    Yasmine Tovar MD

## 2022-11-03 NOTE — PROGRESS NOTES
SW left VM for patient's  to discuss therapy's recommendation of TCU vs home care.     Sarah Garg

## 2022-11-03 NOTE — CONSULTS
[facility]  Palliative Care Consultation Note    Patient: Jennifer Rashid  Date of Admission:  11/1/2022    Requesting Clinician / Team: Jose EDGE/[facility]  Reason for consult: Goals of care    Impression & Recommendations:  Ree is a 89-year old woman diagnosed with NSCLC in 2018 who has previously well-tolerated and benefited from cancer-directed treatments since diagnosis including most recently pemetrexed.  She has evidence of possible progression on maintenance pemetrexed, and Dr Gerard and oncology PA/APRN will be seeing Jennifer this hospital stay (they are awaiting cytology results from L thoracentesis).  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    Goals of care: Currently life prolonging, family considering possible change to comfort focused goals of care.  NOT ON COMFORT CARE.    Advanced care planning: HCD found from [employer] system and uploaded to merged Epic.  Names  Salvatore as primary agent, son Eugenio as first alternate.   Eugenio notes Jennifer told him she wanted him to be primary surrogate.  DNR/DNI (changed today).  Recommend POLST prior to discharge.    Support: Spiritual >> Episcopalian.   to Salvatore; they recently moved into [facility] ([facility]) and Jennifer is on a wait list for memory care at the facility.  They have four children, one son in town, an intellectually disabled daughter who resides in a group home, two sons ou t of [facility].    Symptom assessment:    Dyspnea: moderate, due to hypoxic respiratory failure.    Xerostomia: favor multifactorial (use of O2 mask, decreased oral intake)  Continue with frequent sips of fluid, ice chips  Consider biotene (ordered prn)     Pain: currently Jennifer denies pain.    Encephalopathy/Delirium with recent months-long cognitive decline:   - this is significant over past six months--concerning for dementia process.  - avoid  deliriogenic medications.  - recommend attention to sleep, avoid tethers.    Cachexia (favor cancer-related), with severe protein calorie malnutrition (BMI 16, marked sarcopenia)  - no evidence that nutritional supplements will reverse severe cachexia  - diet as tolerated.  - no complaints of thrush/pain with eating or dysgeusia.  - would have high risk thrombosis w megace, not great evidence of benefit and significant evidence harm.  - could consider steroid to boost appetite.    These recommendations have been discussed with Dr EDGE, Dr Gerard ([facility]).      Thank you for the opportunity to participate in the care of this patient and family. Our team: will continue to follow.     During regular M-F work hours -- if you are not sure who specifically to contact, may contact our service via the [facility] Palliative Medicine pager: [phone]    (Please use GotoTelON for text paging if possible, use link under Palliative service)     [facility] Palliative Department voice mail (checked M-F 8a-4pm approx): [phone]      Assessments:  Jennifer Rashid is a 89 year old female with R sided non-small cell lung cancer (diagnosed in 2018, follows with [facility]/Dr Gerard and for a time with [facility] but graduated back to Dr Gerard in summer 2022).  She presented with loose stools/diarrhea x 3 days and dyspnea for a week and worsening weakness and found to have delirium, leukocytosis (treating for possible pneumonia) in context of falls and cognitive impairment.    Today, the patient was seen for:  Introduction to palliative care, establish rapport, support to family members/health care agents, initial goals of care discussion.    Prognosis, Goals, & Planning:      Functional Status just prior to hospitalization: 3 (Capable of only limited self-care; needs help with ADLs; in bed/chair >50% of waking hours)      Prognosis, Goals, and/or Advance Care Planning were addressed today: Yes        Summary/Comments: Discussed goals of  treatment with son Dr Rashid (Eugenio).       Patient's decision making preferences: unable to assess          Patient has decision-making capacity today for complex decisions: No            I have concerns about the patient/family's health literacy today: No           Patient has a completed Health Care Directive: Yes, and on file.      Code status: Full Code initially, discussed with health care agent and son Eugenio who assents to me placing DNR order.    Coping, Meaning, & Spirituality:   Mood, coping, and/or meaning in the context of serious illness were addressed today: Yes  Summary/Comments: Hinduism/Synagogue.    Social:     Living situation: prior to admission, resided at [facility] with her .    Key family / caregivers: , Salvatore; they have two adult sons, one in [facility], and other (Eugenio) is MD in [facility].    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    Substance use history: no MARIE history.    Financial concerns: not discussed.    Current in-home services: has [facility] support, receiving assistance with meals, housework, laundry, bathing, toileting, medications.    History of Present Illness:  History gathered today from: patient, family/loved ones, medical chart, medical team members, outside records including Care Everywhere, health care directive/s     Jennifer Rashid is a 89 year old female with R sided non-small cell lung cancer (diagnosed in 2018 stage IIIB, [facility] and for a time with [facility] but graduated back to Dr Gerard in summer 2022).  She presented with loose stools/diarrhea x 3 days and dyspnea for a week and worsening weakness and found to have delirium, leukocytosis (treating for possible pneumonia) in context of falls and cognitive impairment.    Jennifer has been on osimertinib, then concurrent chemoradiation w eekly carboplatin and taxol in fall 2018, then  durvalumab, then carboplatin-pemetrexed, then most recently pemetrexed for maintenance.    Over the past six months she has had progressive memory impairment and has become more dependent in her ADLs, is able to ambulate with a walker and has had several falls.    Ree is unable to provide much history to me due to encephalopathy.  She cannot tell me where things are at with her lung cancer, what treatment she's been receiving or options she has for further treatment.  She does ask for some orange juice, cannot tell me if she had breakfast (CNA affirmed she has had several cups of orange juice and did eat breakfast this morning).  I left a message with her , Salvatore.  I spoke with son, Eugenio who is listed as health care agent.    Eugenio note Ree has had good response to initial cancer treatments and initially had goal of life prolonging treatments.  He notes she has been progressively weak and having difficulties with falls at her JON.  She has had waxing/waning delirium outside the hospital setting and would have some good days but they have been getting fewer and farther between more difficult days.    Ree is on a wait list for memory care at [facility] and receives assistance with bathing, toileting/incontience, medications, meals; she requires assistance with ADLs and iADLS.    I spoke with Dr Gerard who affirms recent PETCT showed an FDG avid lesion in the thoracic spine which was not biopsied and was treated with single fraction radiation treatment.  Dr Gerard has noted progressive decline in Jennifer's cognitive function also.  She is aware of the L thoracentesis and pending pathology results; she plans to follow up once pathology results are back--any further treatment would require tissue sampling and currently unclear where best area to biopsy would be if pleural fluid cytology is unrevealing.  She also noted that hospice would be appropriate in Ree's clinical context if pt/family are in  agreement.    Key Palliative Symptom Data:  # Pain severity the last 12 hours: low  # Dyspnea severity the last 12 hours: moderate  # Nausea severity the last 12 hours: none  # Anxiety severity the last 12 hours: low    ROS:  Comprehensive ROS is unable to be completed due to significant delirium.     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     Allergies:  Allergies   Allergen Reactions     Penicillins Rash        Medications:  I have reviewed this patient's medication profile and medications from this hospitalization.   Noted:  Current Facility-Administered Medications   Medication     acetaminophen (TYLENOL) tablet 650 mg     alum & mag hydroxide-simethicone (MAALOX) suspension 30 mL     Azelastine HCl SOLN 1 spray     azithromycin (ZITHROMAX) 250 mg in sodium chloride 0.9 % 250 mL intermittent infusion     benzocaine-menthol (CEPACOL) 15-3.6 MG lozenge 1 lozenge     benzonatate (TESSALON) capsule 100 mg     bisacodyl (DULCOLAX) EC tablet 5 mg     bisacodyl (DULCOLAX) suppository 10 mg     cefTRIAXone (ROCEPHIN) 2 g vial to attach to  ml bag for ADULTS or NS 50 ml bag for PEDS     enoxaparin ANTICOAGULANT (LOVENOX) injection 30 mg     escitalopram (LEXAPRO) tablet 5 mg     folic acid (FOLVITE) tablet 1,000 mcg     guaiFENesin  "(ROBITUSSIN) 20 mg/mL solution 10 mL     heparin 100 UNIT/ML injection 5-10 mL     heparin lock flush 10 UNIT/ML injection 5-10 mL     heparin lock flush 10 UNIT/ML injection 5-10 mL     ipratropium - albuterol 0.5 mg/2.5 mg/3 mL (DUONEB) neb solution 3 mL     ipratropium - albuterol 0.5 mg/2.5 mg/3 mL (DUONEB) neb solution 3 mL     levothyroxine (SYNTHROID/LEVOTHROID) tablet 88 mcg     magnesium hydroxide (MILK OF MAGNESIA) suspension 30 mL     metoprolol succinate ER (TOPROL XL) 24 hr tablet 25 mg     [Held by provider] mirtazapine (REMERON) tablet 15 mg     senna-docusate (SENOKOT-S/PERICOLACE) 8.6-50 MG per tablet 1 tablet     sodium chloride (NEBUSAL) 3 % neb solution 3 mL     sodium chloride (PF) 0.9% PF flush 10-20 mL     sodium chloride (PF) 0.9% PF flush 10-20 mL     sodium chloride (PF) 0.9% PF flush 10-20 mL         PERTINENT PHYSICAL EXAMINATION:  Vital Signs: Blood pressure 130/77, pulse 83, temperature 97.7  F (36.5  C), temperature source Axillary, resp. rate 18, height 1.626 m (5' 4\"), weight 39.5 kg (87 lb), SpO2 94 %.   GENERAL: somnolent 89 yo woman, cachectic.  Opens eyes to verbal stim.  SKIN: Warm and dry   HEENT: Normocephalic, anicteric sclera, moist mucous membranes and marked temporal muscle wasting, loss of periorbital fat, supraclavicular fat etc.  LUNGS: Clear to auscultation anterolaterally;with accessory muscle use.  Rhonchi noted bilaterally.  Mod labored, takes off oxymask at times.  CARDIAC: RRR, normal s1/s2, w/o m/r/g   ABDOMINAL: BS (+), soft, non distended, non tender  MUSKL: no gross joint deformities   EXTREMITIES: trace leg edema. No cyanosis, pulses 2+ and symmetrical  NEUROLOGIC: oriented to place, aware she came to hospital due to a fall, but unable to provide other information.  No tremor.    PSYCH: affect flat.  Brief answers to questions.    Data reviewed:  Recent imaging reviewed, my comments on pertinents:   CT head w/o contrast 11/2/22:     IMPRESSION:   1.  No CT " evidence for acute intracranial process. Please note evaluation for metastatic disease is limited given the lack of intravenous contrast.   2.  Brain atrophy and presumed chronic microvascular ischemic changes as above.     Ultrasound thoracentesis done 11/2/22, cytology and cultures pending.      CT Chest PE w contrast 11/1/22  IMPRESSION:   1.  Since 09/23/2022 PET/CT, there has been a mild to moderate increase in size of the loculated left pleural effusion causing an increase in the left lower lobe atelectasis. There has been an increase in atelectatic changes in the right lung most marked    in the right lower lobe.     2.  No PE, dissection, or aneurysm.     3.  Changes of known neoplasm and sequelae from neoplastic treatments.  Recent lab data reviewed, my comments on pertinents:   Last Comprehensive Metabolic Panel:  Sodium   Date Value Ref Range Status   11/02/2022 141 136 - 145 mmol/L Final     Potassium   Date Value Ref Range Status   11/02/2022 4.1 3.4 - 5.3 mmol/L Final   09/24/2021 3.9 3.5 - 5.0 mmol/L Final     Chloride   Date Value Ref Range Status   11/02/2022 101 98 - 107 mmol/L Final   09/24/2021 102 98 - 107 mmol/L Final     Carbon Dioxide (CO2)   Date Value Ref Range Status   11/02/2022 27 22 - 29 mmol/L Final   09/24/2021 28 22 - 31 mmol/L Final     Anion Gap   Date Value Ref Range Status   11/02/2022 13 7 - 15 mmol/L Final   09/24/2021 11 5 - 18 mmol/L Final     Glucose   Date Value Ref Range Status   11/02/2022 81 70 - 99 mg/dL Final   09/24/2021 66 (L) 70 - 125 mg/dL Final     GLUCOSE BY METER POCT   Date Value Ref Range Status   11/02/2022 85 70 - 99 mg/dL Final     Urea Nitrogen   Date Value Ref Range Status   11/02/2022 17.6 8.0 - 23.0 mg/dL Final   09/24/2021 15 8 - 28 mg/dL Final     Creatinine   Date Value Ref Range Status   11/02/2022 0.66 0.51 - 0.95 mg/dL Final     GFR Estimate   Date Value Ref Range Status   11/02/2022 83 >60 mL/min/1.73m2 Final     Comment:     Effective December  21, 2021 eGFRcr in adults is calculated using the 2021 CKD-EPI creatinine equation which includes age and gender (Jada et al., NEJ, DOI: 10.1056/ALGMfc5068875)   04/10/2021 >60 >60 mL/min/1.73m2 Final     Calcium   Date Value Ref Range Status   11/02/2022 8.2 8.2 - 9.6 mg/dL Final     CBC RESULTS: Recent Labs   Lab Test 11/03/22  0456   WBC 10.7   RBC 3.22*   HGB 11.2*   HCT 37.2   *   MCH 34.8*   MCHC 30.1*   RDW 18.8*   *     Lab Results   Component Value Date    ALBUMIN 3.0 11/02/2022    ALBUMIN 2.5 04/10/2021     Liza Rai MD  [facility] Palliative Medicine Service: [facility]  Department voice mail (checked M-F 8a-4pm approx): [phone]  [facility] Palliative Medicine pager: [phone]  (Please use Sellaround for text paging if possible, use link under Palliative service)  May page me directly via Sellaround

## 2022-11-04 NOTE — PROGRESS NOTES
Care Management Follow Up    Length of Stay (days): 3    Expected Discharge Date: 11/06/2022     Concerns to be Addressed:       Patient plan of care discussed at interdisciplinary rounds: Yes    Anticipated Discharge Disposition:       Anticipated Discharge Services:    Anticipated Discharge DME:      Patient/family educated on Medicare website which has current facility and service quality ratings:    Education Provided on the Discharge Plan:    Patient/Family in Agreement with the Plan:      Referrals Placed by CM/SW:    Private pay costs discussed: Not applicable    Additional Information:  Patient lives at [facility] with her  and is on the waiting list for Memory Care. It is unknown at this time if that is a high enough level of care. Therapy recommends TCU or home care. SW talked to  about options and he would like to wait until he talks to palliative more about patient's goals of care.     3:36 PM  SW received VM from Makayla [facility] care manager ([phone]) notifying LASHAY that patients PCP is Dr. Alejandra Swift.    Sarah Garg

## 2022-11-04 NOTE — PROGRESS NOTES
Progress Note     Primary Oncologist/Hematologist:            Assessment and Plan:New actions/orders today (11/04/2022) are underlined.     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  She is currently receiving Pemetrexed 375mg/m2 every 3 weeks. Most recent PET 9/23/22 showed progression to T1 which was planned for palliative radiation this week but unable to proceed due to nausea. She is now admitted for progressive shortness of breath.      Recurrent pleural effusion, suspicious for disease progression. S/p thoracentesis 11/2/22; awaiting cytology.      PLAN:   1. Appreciate palliative care involvement regarding goals of care conversations  2. Recommend PET/CT outpatient for restaging.    3. Pending cytology with regards to prognosis. Patient is hospice appropriate.     Traci Monroe  [facility]  Office: [phone]  Cell: [phone] Monday through Friday, 8AM-5PM. After hours and weekends, please use answering service.         Interval History:     Patient remains confused and on one-to-one.  She is tired and remains restless.              Review of Systems:     The 5 point Review of Systems is negative other than noted in the HPI             Medications:   Scheduled Medications    Azelastine HCl  1 spray Nasal Daily     azithromycin  250 mg Intravenous Q24H     cefTRIAXone  2 g Intravenous Q24H     enoxaparin ANTICOAGULANT  30 mg Subcutaneous Q24H     escitalopram  5 mg Oral Daily     [START ON 11/5/2022] folic acid  400 mcg Oral Daily     heparin  5-10 mL Intracatheter Q28 Days     heparin lock flush  5-10 mL Intracatheter Q24H     ipratropium - albuterol 0.5 mg/2.5 mg/3 mL  3 mL Nebulization TID     levothyroxine  88 mcg Oral QAM AC     metoprolol succinate ER  25 mg Oral Daily     [Held by provider] mirtazapine  15 mg Oral At Bedtime     sodium chloride  3 mL Nebulization TID     sodium chloride (PF)  10-20  "mL Intracatheter Q28 Days     PRN Medications  acetaminophen, alum & mag hydroxide-simethicone, sore throat, benzonatate, bisacodyl, bisacodyl, guaiFENesin, heparin lock flush, ipratropium - albuterol 0.5 mg/2.5 mg/3 mL, magnesium hydroxide, senna-docusate, sodium chloride (PF), sodium chloride (PF)               Physical Exam:   Vitals were reviewed  Blood pressure 107/54, pulse 88, temperature 97.7  F (36.5  C), temperature source Oral, resp. rate 18, height 1.626 m (5' 4\"), weight 39.5 kg (87 lb), SpO2 96 %.  Wt Readings from Last 4 Encounters:   11/03/22 39.5 kg (87 lb)       I/O last 3 completed shifts:  In: 1419 [P.O.:834; I.V.:585]  Out: 500 [Urine:500]    Constitutional: Awake, alert, cooperative, no apparent distress   Lungs: Clear to auscultation bilaterally, no crackles or wheezing   Cardiovascular: Regular rate and rhythm, normal S1 and S2, and no murmur noted   Abdomen: Normal bowel sounds, soft, non-distended, non-tender   Skin: No rashes, no cyanosis, no edema   Other:               Data:   All laboratory data and imaging studies reviewed.    CMP  Recent Labs   Lab 11/04/22  0907 11/03/22  0456 11/02/22  1312 11/02/22  0820 11/01/22  2114 11/01/22  1849     --   --  141  --  142   POTASSIUM 4.8  --   --  4.1  --  4.7   CHLORIDE 102  --   --  101  --  101   CO2 35*  --   --  27  --  32*   ANIONGAP 3*  --   --  13  --  9   GLC 90  --  85 81  --  104*   BUN 11.8  --   --  17.6  --  17.1   CR 0.50*  --   --  0.66  --  0.64   GFRESTIMATED 89  --   --  83  --  83   MORRO 7.8*  --   --  8.2  --  8.8   MAG 2.1 2.5*  --  1.7  --   --    PHOS 2.2*  --   --   --   --   --    PROTTOTAL  --   --   --  6.6 6.4  --    ALBUMIN 2.7*  --   --  3.0*  --   --    BILITOTAL  --   --   --  0.4  --   --    ALKPHOS  --   --   --  120*  --   --    AST  --   --   --  27  --   --    ALT  --   --   --  10  --   --      CBC  Recent Labs   Lab 11/04/22  0907 11/03/22  0456 11/02/22  0820 11/01/22  1849   WBC  --  10.7 14.2*  " 14.2* 19.7*   RBC  --  3.22* 3.30*  3.30* 3.39*   HGB 10.3* 11.2* 11.8  11.8 11.9   HCT  --  37.2 38.0  38.0 38.6   MCV  --  116* 115*  115* 114*   MCH  --  34.8* 35.8*  35.8* 35.1*   MCHC  --  30.1* 31.1*  31.1* 30.8*   RDW  --  18.8* 19.1*  19.1* 18.9*   PLT 91* 100* 124*  124* 147*     INR  Recent Labs   Lab 11/01/22  1849   INR 1.42*     Data   Results for orders placed or performed during the hospital encounter of 11/01/22 (from the past 24 hour(s))   Hemoglobin   Result Value Ref Range    Hemoglobin 10.3 (L) 11.7 - 15.7 g/dL   Platelet count   Result Value Ref Range    Platelet Count 91 (L) 150 - 450 10e3/uL   Renal panel   Result Value Ref Range    Sodium 140 136 - 145 mmol/L    Potassium 4.8 3.4 - 5.3 mmol/L    Chloride 102 98 - 107 mmol/L    Carbon Dioxide (CO2) 35 (H) 22 - 29 mmol/L    Anion Gap 3 (L) 7 - 15 mmol/L    Glucose 90 70 - 99 mg/dL    Urea Nitrogen 11.8 8.0 - 23.0 mg/dL    Creatinine 0.50 (L) 0.51 - 0.95 mg/dL    GFR Estimate 89 >60 mL/min/1.73m2    Calcium 7.8 (L) 8.2 - 9.6 mg/dL    Albumin 2.7 (L) 3.5 - 5.2 g/dL    Phosphorus 2.2 (L) 2.5 - 4.5 mg/dL   Magnesium   Result Value Ref Range    Magnesium 2.1 1.7 - 2.3 mg/dL

## 2022-11-04 NOTE — PROGRESS NOTES
Pt on 3L NC. BS diminished throughout. Given Nebusal/Duoneb . BS unchanged after treatment.  RT will continue to monitor.

## 2022-11-04 NOTE — PROGRESS NOTES
Pt remains on 3L NC, BS wheeze on expiratory and inspiratory. Pt given duoneb and nebusal treatment. BS post treatment unchanged. Pt asked multiple times to have the mask removed, but finished the treatment. Pt used aerobika flutter with assistance and prompting. RT will continue to monitor.     Phyllis Kerns, RT

## 2022-11-04 NOTE — PLAN OF CARE
Goal Outcome Evaluation:    Pt is disoriented to time place and situation. Is easily reoriented but forgets quickly and removes her nasal cannula/oxymask constantly. Then desats to 70s and takes a long time to come back up. Pt is in mitt restraints which prevents pt from pulling her IV. No staff for ordered 1:1. VSS. Sleeping on and off through the night.     Problem: Risk for Delirium  Goal: Improved Behavioral Control  Outcome: Not Progressing  Intervention: Minimize Safety Risk  Recent Flowsheet Documentation  Taken 11/4/2022 0400 by Dorita Oliveira, RN  Enhanced Safety Measures: bed alarm set  Taken 11/4/2022 0000 by Dorita Oliveira, RN  Enhanced Safety Measures: bed alarm set  Taken 11/3/2022 2000 by Dorita Oliveira RN  Enhanced Safety Measures: bed alarm set     Problem: Gas Exchange Impaired  Goal: Optimal Gas Exchange  Outcome: Not Progressing     Problem: Fatigue (Oncology Care)  Goal: Improved Activity Tolerance  Outcome: Not Progressing  Intervention: Promote Improved Energy  Recent Flowsheet Documentation  Taken 11/4/2022 0400 by Dorita Oliveira, RN  Activity Management: activity adjusted per tolerance  Taken 11/4/2022 0000 by Dorita Oliveira, RN  Activity Management: activity adjusted per tolerance  Taken 11/3/2022 2000 by Dorita Oliveira, RN  Activity Management: activity adjusted per tolerance     Problem: Oral Intake Altered (Oncology Care)  Goal: Optimal Oral Intake  Outcome: Not Progressing

## 2022-11-04 NOTE — PROGRESS NOTES
[facility]    Medicine Progress Note - Hospitalist Service    Date of Admission:  11/1/2022    Assessment & Plan   Jennifer Rashid is a 89 year old female with past medical history of recurrent lung cancer, recurrent right pleural effusion requiring thoracocentesis, HTN who presented to ED for evaluation of shortness of breath, found to have recurrent pleural effusion and admitted for further management.       Acute respiratory failure secondary to recurrent left pleural effusion;  Recurrent lung cancer;  --On admission CT chest negative for PE but reported Since 09/23/2022 PET/CT, there has been a mild to moderate increase in size of the loculated left pleural effusion causing an increase in the left lower lobe atelectasis. There has been an increase in atelectatic changes in the right lung most marked   in the right lower lobe.  --On admission required 4 L O2, after thoracocentesis down to 2-3 L.  Titrate O2 as able.   --Incentive spirometry, flutter valve but patient unable to do due to sleepiness.  Added saline nebulizer and DuoNeb for mucous plugging seen in CAT scan.  --Follow-up on pleural fluid studies--so far no organisms seen  --Oncology consulted and awaiting input    Leukocytosis; likely multifactorial-possibly due to underlying cancer, cannot rule out pneumonia given  shortness of breath and CT findings.  --No reported fever.  --Check procalcitonin--mildly elevated at 0.14  --Started antibiotics Rocephin and azithromycin, continue per pneumonia protocol.  -- Aspiration precaution    Acute metabolic encephalopathy; likely multifactorial  -- CT head negative for acute intracranial process  --Normal TSH, B12 >4K, normal folate  -- Currently on one-to-one observation  -- Continue clinical monitoring  --Hold home Remeron.  Avoid sedatives    Mild lactic acidosis; uncertain significance  --Recheck trending down despite no intervention.  Hemodynamically stable, afebrile.  Hesitant  "to give bolus IV fluid given recurrent pleural effusion, elevated BNP.  --On antibiotics for possible pneumonia    Essential hypertension;  -- PTA metoprolol    Chronic thrombocytopenia; likely from chemotherapy  --Trend    Hypothyroidism;  -- TSH normal, continue home Synthyroid    Goal of care; appreciated palliative care input    DVT prophylaxis; subcu Lovenox           Diet: Regular Diet Adult    DVT Prophylaxis: Enoxaparin (Lovenox) SQ  Upton Catheter: Not present  Central Lines: None  Cardiac Monitoring: None  Code Status: No CPR- Do NOT Intubate      Disposition Plan      Expected Discharge Date: 11/06/2022        Discharge Comments: Clinical progress, IV antibiotics, palliative care consult        The patient's care was discussed with the Bedside Nurse, Care Coordinator/, Patient and Palliative care team.    Jose EDGE MD  Hospitalist Service  [facility]  Securely message with the Vocera Web Console (learn more here)  Text page via [employer] Paging/Directory         Clinically Significant Risk Factors              # Hypoalbuminemia: Lowest albumin = 3 g/dL (Ref range: 3.5-5.2) at 11/2/2022  8:20 AM, will monitor as appropriate   # Thrombocytopenia: Lowest platelets = 100 (Ref range: 150-450) in last 2 days, will monitor for bleeding        # Cachexia: Estimated body mass index is 14.93 kg/m  as calculated from the following:    Height as of this encounter: 1.626 m (5' 4\").    Weight as of this encounter: 39.5 kg (87 lb)., PRESENT ON ADMISSION         ______________________________________________________________________    Interval History   Patient seen and examined.  Overnight events reviewed.  Notes, labs, imaging report personally reviewed.  Patient more alert and awake.  Patient does follow simple commands.  Denied short of breath.  Discussed with bedside attendant reported ate good breakfast.  Discussed with nursing staffs.  Discussed with palliative care who has been " communicating with family as about goal of care.    Data reviewed today: I reviewed all medications, new labs and imaging results over the last 24 hours. I personally reviewed no images or EKG's today.    Physical Exam   Vital Signs: Temp: 97.7  F (36.5  C) Temp src: Oral BP: 107/54 Pulse: 88   Resp: 18 SpO2: 97 % O2 Device: Nasal cannula Oxygen Delivery: 3 LPM  Weight: 87 lbs 0 oz    General: Not in obvious distress.  Cachectic  HEENT: Normocephalic, supple neck  Chest: Clear to auscultation bilateral anteriorly, decreased breath sound at bases, crackles on bases  Heart: S1S2 normal, regular  Abdomen: Soft. NT, ND. Bowel sounds- active.  Extremities: No legs swelling  Neuro: More alert and awake today, moves all extremities      Data   Recent Labs   Lab 11/03/22  0456 11/02/22  1312 11/02/22  0820 11/01/22  2114 11/01/22  1849   WBC 10.7  --  14.2*  14.2*  --  19.7*   HGB 11.2*  --  11.8  11.8  --  11.9   *  --  115*  115*  --  114*   *  --  124*  124*  --  147*   INR  --   --   --   --  1.42*   NA  --   --  141  --  142   POTASSIUM  --   --  4.1  --  4.7   CHLORIDE  --   --  101  --  101   CO2  --   --  27  --  32*   BUN  --   --  17.6  --  17.1   CR  --   --  0.66  --  0.64   ANIONGAP  --   --  13  --  9   MORRO  --   --  8.2  --  8.8   GLC  --  85 81  --  104*   ALBUMIN  --   --  3.0*  --   --    PROTTOTAL  --   --  6.6 6.4  --    BILITOTAL  --   --  0.4  --   --    ALKPHOS  --   --  120*  --   --    ALT  --   --  10  --   --    AST  --   --  27  --   --

## 2022-11-04 NOTE — PLAN OF CARE
"  Problem: Plan of Care - These are the overarching goals to be used throughout the patient stay.    Goal: Plan of Care Review  Description: The Plan of Care Review/Shift note should be completed every shift.  The Outcome Evaluation is a brief statement about your assessment that the patient is improving, declining, or no change.  This information will be displayed automatically on your shift note.  Outcome: Progressing  Goal: Patient-Specific Goal (Individualized)  Description: You can add care plan individualizations to a care plan. Examples of Individualization might be:  \"Parent requests to be called daily at 9am for status\", \"I have a hard time hearing out of my right ear\", or \"Do not touch me to wake me up as it startles me\".  Outcome: Progressing  Goal: Absence of Hospital-Acquired Illness or Injury  Outcome: Progressing  Intervention: Identify and Manage Fall Risk  Recent Flowsheet Documentation  Taken 11/4/2022 1232 by Linette Harris, RN  Safety Promotion/Fall Prevention: activity supervised  Taken 11/4/2022 0800 by Linette Harris, RN  Safety Promotion/Fall Prevention: activity supervised  Goal: Optimal Comfort and Wellbeing  Outcome: Progressing   Goal Outcome Evaluation:  Mittens are off patient tries to remove O2 and her O2 saturation goes down to the 70%  and at the time found her to gasp for breath and in panic  worked with PT and was up in the recliner for dinner  eating with good appetite today found her to be more alert than yesterday  and made few good conversations  denied any pain or any discomfort .  and Son were here and had a meeting with palliative MD .                        "

## 2022-11-04 NOTE — PROGRESS NOTES
RESPIRATORY CARE NOTE   Patient is on 3L NC, BS diminished with inspiratory wheezing, gave nebusal/duoneb treatment x1, BS post treatment unchanged. Patient lethargic and complaining of hand mitts. Continued to push aerosol mask off of face and asked RT multiple times to stop the treatment. RT redirected and continued treatment. Patient has no productive cough, weak and dry. RT will continue to monitor.     Janna Quintanilla, RT

## 2022-11-05 NOTE — PROGRESS NOTES
"Palliative Dr. Rai requested spiritual care visit concerning pt's decline in health. Pt was awake, alert, sitting in recliner watching TV. A sitter was also in room.    Writer introduced herself and explained briefly the relationship between body and spirit to our well being. Jennifer recognized the role of a . \"O, it's you. I don't know if I have much to say. I know my life is ending. I have said all I need to say to my family, things like that.\"  It seemed pt was not wanting to visit, resisting efforts to engage in get to know you inquiries. \"That's all for now. When it gets that time, I'll contact you.\"    Spiritual Care is available as needed or requested.    CONG Duarte.  Palliative Care Team  "

## 2022-11-05 NOTE — SIGNIFICANT EVENT
Significant Event Note    Time of event: 1:08 AM November 5, 2022    Description of event:  Paged regarding persistently elevated HR. Patient is otherwise hemodynamically stable with no changes to symptoms or mentation. Stat EKG ordered which showed A fib with RVR. Patient is otherwise hemodynamically stable with MAP of 70.     Plan:  - 500 mL fluid bolus to improve BP, goal of SBP above 100   - IV metoprolol 5mg q5min PRN x 3     Mercedes Leggett MD

## 2022-11-05 NOTE — PROGRESS NOTES
Pt is on 3 lpm 02 sating 96%  RR 18. Breath sounds diminished. Neb given x 1, no change in BS post neb tx. Rt continue to monitor.

## 2022-11-05 NOTE — PLAN OF CARE
Problem: Dysrhythmia  Goal: Normalized Cardiac Rhythm  Outcome: Not Progressing     Goal Outcome Evaluation:         Been sinus in 90's, went sinus tach around 2228. MD updated. Monitor for now.

## 2022-11-05 NOTE — PROGRESS NOTES
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  She is currently receiving Pemetrexed 375mg/m2 every 3 weeks. Most recent PET 9/23/22 showed progression to T1 which was planned for palliative radiation this week but unable to proceed due to nausea. She is now admitted for progressive shortness of breath, with CT findings of moderate to large loculated L sided pleural effusion s/p thoracentesis     INTERVAL HISTORY:  Unfortunately, last night she developed an episode of tachypnea, was given oral morphine, went into A. fib with RVR in the 150s, which responded to IV metoprolol, with conversion back to normal sinus. She is now on 4L.  Pt seen at bedside. She is sleeping. Nurses says she has been refusing all medications. She is not eating. She was recently diuresed with minimal urine output.    ASSESSMENT/PLAN:  Jennifer Rashid is a 89F with metastatic EGFR positive NSCLC most recently on significant agent Pemetrexed with now likely systemic progression and reaccumulating L sided pleural effusions causing respiratory distress, hypoxia, Afib with RVR. She underwent thoracentesis of the L pleural fluid, studies came back exudative, cytology is pending, but most suspicious for progression of disease.    I spoke to Eugenio, her son today. She has had a subacute decline over the past six months with progressive dementia and declining performance status, necessitating a recent move to assisted living. This is despite good control of her systemic disease. She now has systemic progression with a reaccumulating L sided pleural effusion leading to respiratory distress. Systemic therapy options even in healthy patients are  "limited to further cytotoxic chemotherapy, versus off-label use of 2nd generation EGFR inhibitors.     Given the clinical context, the family is in agreement with no further systemic therapy, and pursuit of hospice. Hospice consult is in progress.   IR consult for placement of pleurex catheter is reasonable to alleviate dyspnea at the end of life. The family is interested in inpatient hospice or transition to a hospice facility, there is insufficient support at home.     Please do not hesitate to call with questions.   Juan Gerard MD  [facility]  [phone]         Chief Complaint:   Shortness of Breath (/) and Generalized Weakness           History of Present Illness:   This patient is a 89 year old female.  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  Given that there was no actionable mutation on her Guardant 360 testing at the time of metastatic disease, she was initiated on treatment with carboplatin plus pemetrexed on 2020. She finished 4 cycles, and is on maintenance pemetrexed. Treatment was held on 5/3/21 x 6 weeks for weakness, weight loss, and a syncopal episode, with significant improvement in her appetite, weight and energy level. She resumed treatment on 21 at 75% of the full dose, which she has tolerated fairly well. She is currently on 375mg/m2 every 3 weeks.     PET/CT 22 showed T1 lesion - planned for palliative radiation.    22 patient Admitted for shortness of breath and recurrent pleural effusion.     22 s/p thoracentesis-awaiting cytology.         Physical Exam:   Vitals were reviewed  Blood pressure (!) 150/73, pulse 89, temperature 97.6  F (36.4  C), temperature source Axillary, resp. rate 24, height 1.626 m (5' 4\"), weight 39.5 kg (87 lb), SpO2 (!) 89 %.  Temperatures:  Current - Temp: 97.7  F (36.5  C); Max - Temp  Av.8  F (36.6  C)  Min: 97.7  F (36.5  C)  Max: 98  F " (36.7  C)  Respiration range: Resp  Av  Min: 18  Max: 24  Pulse range: Pulse  Av.4  Min: 83  Max: 95  Blood pressure range: Systolic (24hrs), Av , Min:103 , Max:130   ; Diastolic (24hrs), Av, Min:58, Max:77    Pulse oximetry range: SpO2  Av %  Min: 94 %  Max: 100 %    Intake/Output Summary (Last 24 hours) at 11/3/2022 0920  Last data filed at 11/3/2022 0900  Gross per 24 hour   Intake 1262 ml   Output --   Net 1262 ml     GENERAL: Sleeping peacefully, in no distress.   Not examined.               Past Medical History:   I have reviewed this patient's past medical history  Past Medical History:   Diagnosis Date     Fibrocystic breast      Hypertension      Non-small cell lung cancer (H)      Thyroid cancer (H)              Past Surgical History:   I have reviewed this patient's past surgical history  Past Surgical History:   Procedure Laterality Date     AORTIC ARCH REPAIR      congenital abnormality     BIOPSY BREAST Bilateral     benign     IR CHEST PORT PLACEMENT > 5 YRS OF AGE  10/9/2018     THYROIDECTOMY, PARTIAL       US THORACENTESIS  2021     US THORACENTESIS  3/16/2021     US THORACENTESIS  2021     US THORACENTESIS  2021               Social History:   I have reviewed this patient's social history  Social History     Tobacco Use     Smoking status: Never     Smokeless tobacco: Never   Substance Use Topics     Alcohol use: Yes     Comment: Alcoholic Drinks/day: Occasional glass of wine every several weeks             Family History:   I have reviewed this patient's family history  Family History   Problem Relation Age of Onset     Colon Cancer Son 45.00     Breast Cancer No family hx of              Allergies:     Allergies   Allergen Reactions     Penicillins Rash             Medications:   I have reviewed this patient's current medications  Medications Prior to Admission   Medication Sig Dispense Refill Last Dose     Azelastine HCl 137 MCG/SPRAY SOLN Spray 1  spray in nostril daily   11/1/2022     escitalopram (LEXAPRO) 5 MG tablet Take 5 mg by mouth daily   11/1/2022     folic acid (FOLVITE) 1 MG tablet Take 1,000 mcg by mouth daily   11/1/2022     levothyroxine (SYNTHROID/LEVOTHROID) 88 MCG tablet Take 88 mcg by mouth daily before breakfast   11/1/2022     metoprolol succinate ER (TOPROL XL) 50 MG 24 hr tablet Take 25 mg by mouth daily   11/1/2022     mirtazapine (REMERON) 15 MG tablet Take 15 mg by mouth At Bedtime   10/31/2022     sennosides (SENOKOT) 8.6 MG tablet Take 8.6 mg by mouth 2 times daily as needed for constipation   Unknown at prn     Current Facility-Administered Medications Ordered in Epic   Medication Dose Route Frequency Last Rate Last Admin     acetaminophen (TYLENOL) tablet 650 mg  650 mg Oral Q4H PRN         alum & mag hydroxide-simethicone (MAALOX) suspension 30 mL  30 mL Oral Q4H PRN         Azelastine HCl SOLN 1 spray  1 spray Nasal Daily   1 spray at 11/03/22 0821     azithromycin (ZITHROMAX) 250 mg in sodium chloride 0.9 % 250 mL intermittent infusion  250 mg Intravenous Q24H         benzocaine-menthol (CEPACOL) 15-3.6 MG lozenge 1 lozenge  1 lozenge Buccal Q1H PRN         benzonatate (TESSALON) capsule 100 mg  100 mg Oral TID PRN         bisacodyl (DULCOLAX) EC tablet 5 mg  5 mg Oral Daily PRN         bisacodyl (DULCOLAX) suppository 10 mg  10 mg Rectal Daily PRN         cefTRIAXone (ROCEPHIN) 2 g vial to attach to  ml bag for ADULTS or NS 50 ml bag for PEDS  2 g Intravenous Q24H   2 g at 11/02/22 1401     enoxaparin ANTICOAGULANT (LOVENOX) injection 30 mg  30 mg Subcutaneous Q24H   30 mg at 11/02/22 2355     escitalopram (LEXAPRO) tablet 5 mg  5 mg Oral Daily   5 mg at 11/03/22 0820     folic acid (FOLVITE) tablet 1,000 mcg  1,000 mcg Oral Daily   1,000 mcg at 11/03/22 0820     guaiFENesin (ROBITUSSIN) 20 mg/mL solution 10 mL  10 mL Oral Q4H PRN         heparin 100 UNIT/ML injection 5-10 mL  5-10 mL Intracatheter Q28 Days          heparin lock flush 10 UNIT/ML injection 5-10 mL  5-10 mL Intracatheter Q1H PRN         heparin lock flush 10 UNIT/ML injection 5-10 mL  5-10 mL Intracatheter Q24H         ipratropium - albuterol 0.5 mg/2.5 mg/3 mL (DUONEB) neb solution 3 mL  3 mL Nebulization 3 times daily         ipratropium - albuterol 0.5 mg/2.5 mg/3 mL (DUONEB) neb solution 3 mL  3 mL Nebulization Q4H PRN         levothyroxine (SYNTHROID/LEVOTHROID) tablet 88 mcg  88 mcg Oral QAM AC   88 mcg at 11/03/22 0828     magnesium hydroxide (MILK OF MAGNESIA) suspension 30 mL  30 mL Oral Daily PRN         metoprolol succinate ER (TOPROL XL) 24 hr tablet 25 mg  25 mg Oral Daily   25 mg at 11/03/22 0820     [Held by provider] mirtazapine (REMERON) tablet 15 mg  15 mg Oral At Bedtime         senna-docusate (SENOKOT-S/PERICOLACE) 8.6-50 MG per tablet 1 tablet  1 tablet Oral BID PRN         sodium chloride (NEBUSAL) 3 % neb solution 3 mL  3 mL Nebulization TID         sodium chloride (PF) 0.9% PF flush 10-20 mL  10-20 mL Intracatheter q1 min prn         sodium chloride (PF) 0.9% PF flush 10-20 mL  10-20 mL Intracatheter Q1H PRN         sodium chloride (PF) 0.9% PF flush 10-20 mL  10-20 mL Intracatheter Q28 Days         No current Epic-ordered outpatient medications on file.             Review of Systems:     The 10 point Review of Systems is negative other than noted in the HPI.            Data:   Data   Results for orders placed or performed during the hospital encounter of 11/01/22 (from the past 24 hour(s))   ECG 12-LEAD WITH MUSE (LHE)   Result Value Ref Range    Systolic Blood Pressure  mmHg    Diastolic Blood Pressure  mmHg    Ventricular Rate 148 BPM    Atrial Rate 133 BPM    MS Interval  ms    QRS Duration 80 ms     ms    QTc 474 ms    P Axis  degrees    R AXIS 67 degrees    T Axis -8 degrees    Interpretation ECG       Atrial fibrillation with rapid ventricular response  T-wave inversion in Inferior leads  Abnormal ECG  When compared with ECG  of 01-NOV-2022 19:16,  Atrial fibrillation has replaced Sinus rhythm  Inverted T waves have replaced nonspecific T wave abnormality in Inferior leads  Confirmed by REMY QUACH MD LOC:WW ([id number]) on 11/5/2022 9:37:52 AM     Lactic Acid STAT   Result Value Ref Range    Lactic Acid 1.8 0.7 - 2.0 mmol/L   Magnesium   Result Value Ref Range    Magnesium 1.9 1.7 - 2.3 mg/dL   CBC with platelets   Result Value Ref Range    WBC Count 24.0 (H) 4.0 - 11.0 10e3/uL    RBC Count 2.76 (L) 3.80 - 5.20 10e6/uL    Hemoglobin 9.8 (L) 11.7 - 15.7 g/dL    Hematocrit 32.9 (L) 35.0 - 47.0 %     (H) 78 - 100 fL    MCH 35.5 (H) 26.5 - 33.0 pg    MCHC 29.8 (L) 31.5 - 36.5 g/dL    RDW 18.5 (H) 10.0 - 15.0 %    Platelet Count 78 (L) 150 - 450 10e3/uL   Renal panel   Result Value Ref Range    Sodium 140 136 - 145 mmol/L    Potassium 5.6 (H) 3.4 - 5.3 mmol/L    Chloride 102 98 - 107 mmol/L    Carbon Dioxide (CO2) 34 (H) 22 - 29 mmol/L    Anion Gap 4 (L) 7 - 15 mmol/L    Glucose 111 (H) 70 - 99 mg/dL    Urea Nitrogen 12.6 8.0 - 23.0 mg/dL    Creatinine 0.51 0.51 - 0.95 mg/dL    GFR Estimate 88 >60 mL/min/1.73m2    Calcium 7.6 (L) 8.2 - 9.6 mg/dL    Albumin 2.7 (L) 3.5 - 5.2 g/dL    Phosphorus 2.8 2.5 - 4.5 mg/dL   CBC with Platelets & Differential    Narrative    The following orders were created for panel order CBC with Platelets & Differential.  Procedure                               Abnormality         Status                     ---------                               -----------         ------                     CBC with platelets and d...[[id number]]                      In process                   Please view results for these tests on the individual orders.   XR Chest Port 1 View    Narrative    EXAM: XR CHEST PORT 1 VIEW  LOCATION: [facility]  DATE/TIME: 11/5/2022 9:00 AM    INDICATION: Follow-up pleural effusion post left thoracentesis.  COMPARISON: CT chest 11/01/2022      Impression    IMPRESSION:  Moderate to large loculated left pleural effusion has decreased slightly since 11/01/2022. Small right pleural effusion unchanged. Bilateral parahilar and medial bibasilar consolidative opacities unchanged. No pneumothorax.     RADIOLOGY:  CT chest PE:  IMPRESSION:  1.  Since 09/23/2022 PET/CT, there has been a mild to moderate increase in size of the loculated left pleural effusion causing an increase in the left lower lobe atelectasis. There has been an increase in atelectatic changes in the right lung most marked   in the right lower lobe.  2.  No PE, dissection, or aneurysm.  3.  Changes of known neoplasm and sequelae from neoplastic treatments.    11/22/22 CT head without contrast:  IMPRESSION:  1.  No CT evidence for acute intracranial process. Please note evaluation for metastatic disease is limited given the lack of intravenous contrast.  2.  Brain atrophy and presumed chronic microvascular ischemic changes as above.

## 2022-11-05 NOTE — PLAN OF CARE
"  Problem: Plan of Care - These are the overarching goals to be used throughout the patient stay.    Goal: Plan of Care Review  Description: The Plan of Care Review/Shift note should be completed every shift.  The Outcome Evaluation is a brief statement about your assessment that the patient is improving, declining, or no change.  This information will be displayed automatically on your shift note.  Outcome: Progressing  Goal: Patient-Specific Goal (Individualized)  Description: You can add care plan individualizations to a care plan. Examples of Individualization might be:  \"Parent requests to be called daily at 9am for status\", \"I have a hard time hearing out of my right ear\", or \"Do not touch me to wake me up as it startles me\".  Outcome: Progressing  Goal: Absence of Hospital-Acquired Illness or Injury  Outcome: Progressing  Intervention: Identify and Manage Fall Risk  Recent Flowsheet Documentation  Taken 11/5/2022 1200 by Linette Harris, RN  Safety Promotion/Fall Prevention: activity supervised  Taken 11/5/2022 0900 by Linette Harris, RN  Safety Promotion/Fall Prevention: activity supervised  Goal: Optimal Comfort and Wellbeing  Outcome: Progressing   Goal Outcome Evaluation:  Pt refused all oral medications today Dr EDGE was informed  I talked to son Eugenio who is the POA  and he requested to be called when Ree is more awake and able to talk on the phone  for Family that are out of town  . Mei hawkins was communicated with Hospice   . Ree also declined to eat  but per Youbo the AM 1:1  patient walked to the bathroom to have a bowel movement . Patient is very sleepy and said at one point \"there is no need for all these  \".                           "

## 2022-11-05 NOTE — PROGRESS NOTES
Referral Received - [facility] would like to thank you for the GIP -  Hospice referral.    Referral received.and initial insurance information sent to  Hospice intake.    We are reviewing your patient's eligibility with intake team and medical director at this time.    Our plan is to visit your patient as soon as possible. We will update the Charge Nurse on the unit with [facility] eligibility.    Thank you for the referral.    Stef Dailey RN BSN on 11/5/2022 at 2:03 PM

## 2022-11-05 NOTE — PROGRESS NOTES
[facility]    Medicine Progress Note - Hospitalist Service    Date of Admission:  11/1/2022    Assessment & Plan   Jennifer Rashid is a 89 year old female with past medical history of recurrent lung cancer, recurrent right pleural effusion requiring thoracocentesis, HTN who presented to ED for evaluation of shortness of breath, found to have recurrent left pleural effusion and admitted for further management.    Patient had thoracocentesis on admission.  Patient also getting antibiotics per possible pneumonia.  However, patient condition not improving.  Palliative care team reported patient now DNR/DNI and no escalation of care to ICU.  On 11/5/2022, patient's primary oncologist, Dr. Gerard had detailed conversation with patient's son Mr. Becker. Reported given clinical context, family is in agreement with no further systemic therapy and pursuit of hospice.  Hospice service consulted.   Oncologist also reported that IR consult for placement of Pleurx catheter is reasonable to evaluate dyspnea at the end of the life.  Family is interested in inpatient hospice or transition to hospice facility.  Discussed with .    Acute respiratory failure secondary to recurrent left pleural effusion;  Recurrent lung cancer;  Leukocytosis; likely multifactorial  --On admission CT chest negative for PE but reported Since 09/23/2022 PET/CT, there has been a mild to moderate increase in size of the loculated left pleural effusion causing an increase in the left lower lobe atelectasis. There has been an increase in atelectatic changes in the right lung most marked   in the right lower lobe.  --On admission status post thoracocentesis with removal of 0.8 L fluid.  Follow-up x-ray on 11/5 reported moderate to large loculated left pleural effusion has decreased slightly compared to imaging from 11/1.  --On IV antibiotic for presumed pneumonia  --Patient unable to do flutter valve or incentive  spirometry.  Continue nebulizer as ordered  --Palliative care team added morphine for air hunger.  --On 4 to 5 L O2, titrate O2 as able.  -- Appreciated oncology input, recommended hospice, hospice consulted  -- IR consulted for Pleurx catheter placement for Monday    Acute metabolic encephalopathy; likely multifactorial  -- CT head negative for acute intracranial process  --Normal TSH, B12 >4K, normal folate  -- Currently on one-to-one observation, continue  -- Continue clinical monitoring  --Hold home Remeron due to sedation.  Avoid sedatives    Hyperkalemia; uncertain etiology  -- Patient not acidotic, creatinine better than before  -- Ordered Kayexalate.    Essential hypertension;  -- PTA metoprolol    Chronic thrombocytopenia; likely from chemotherapy  --Trend down    Hypothyroidism;  -- TSH normal.  PTA Synthyroid    Goal of care; appreciated palliative care input.  No escalation of care to ICU         Diet: Regular Diet Adult    DVT Prophylaxis: Discontinued Lovenox as platelet count trending down  Upton Catheter: Not present  Central Lines: None  Cardiac Monitoring: None  Code Status: No CPR- Do NOT Intubate      Disposition Plan     Expected Discharge Date: 11/07/2022        Discharge Comments: Clinical progress, IV antibiotics, palliative care consult        The patient's care was discussed with the Bedside Nurse, Care Coordinator/, Patient's Family and Oncologist.    Jose EDGE MD  Hospitalist Service  [facility]  Securely message with the Vocera Web Console (learn more here)  Text page via Robert Applebaum MD Paging/Directory         Clinically Significant Risk Factors        # Hyperkalemia: Highest K = 5.6 mmol/L (Ref range: 3.4-5.3) in last 2 days, will monitor as appropriate       # Hypoalbuminemia: Lowest albumin = 2.7 g/dL (Ref range: 3.5-5.2) at 11/5/2022  7:52 AM, will monitor as appropriate   # Thrombocytopenia: Lowest platelets = 78 (Ref range: 150-450) in last 2 days,  "will monitor for bleeding        # Cachexia: Estimated body mass index is 14.93 kg/m  as calculated from the following:    Height as of this encounter: 1.626 m (5' 4\").    Weight as of this encounter: 39.5 kg (87 lb)., PRESENT ON ADMISSION         ______________________________________________________________________    Interval History   Patient seen and examined.  Notes, labs, imaging report personally reviewed.  Overnight events reviewed.  Appreciated house officer input.  Nursing staff reported that patient did not sleep all night.  During my visit, patient sleepy but following simple commands.  Denied short of breath, denied pain.  Patient's bedside attendant present during my visit.  During my next visit patient sound sleep, vitals stable.  Discussed with oncologist in detail.  Discussed with patient's son in detail about ongoing medical issues and management and plan of care      Data reviewed today: I reviewed all medications, new labs and imaging results over the last 24 hours. I personally reviewed.  Reviewed EKG and chest x-ray    Physical Exam   Vital Signs: Temp: 97.6  F (36.4  C) Temp src: Axillary BP: 100/52 Pulse: 85   Resp: 22 SpO2: 99 % O2 Device: Nasal cannula Oxygen Delivery: 5 LPM  Weight: 87 lbs 0 oz        Data   Recent Labs   Lab 11/05/22  0752 11/04/22  0907 11/03/22  0456 11/02/22  1312 11/02/22  0820 11/01/22  2114 11/01/22  1849 11/01/22  1849   WBC 24.0*  --  10.7  --  14.2*  14.2*  --   --  19.7*   HGB 9.8* 10.3* 11.2*  --  11.8  11.8  --   --  11.9   *  --  116*  --  115*  115*  --   --  114*   PLT 78* 91* 100*  --  124*  124*  --   --  147*   INR  --   --   --   --   --   --   --  1.42*    140  --   --  141  --   --  142   POTASSIUM 5.6* 4.8  --   --  4.1  --   --  4.7   CHLORIDE 102 102  --   --  101  --   --  101   CO2 34* 35*  --   --  27  --   --  32*   BUN 12.6 11.8  --   --  17.6  --   --  17.1   CR 0.51 0.50*  --   --  0.66  --   --  0.64   ANIONGAP 4* 3*  --   " --  13  --   --  9   MORRO 7.6* 7.8*  --   --  8.2  --   --  8.8   * 90  --  85 81  --   --  104*   ALBUMIN 2.7* 2.7*  --   --  3.0*  --    < >  --    PROTTOTAL  --   --   --   --  6.6 6.4  --   --    BILITOTAL  --   --   --   --  0.4  --   --   --    ALKPHOS  --   --   --   --  120*  --   --   --    ALT  --   --   --   --  10  --   --   --    AST  --   --   --   --  27  --   --   --     < > = values in this interval not displayed.

## 2022-11-05 NOTE — PROGRESS NOTES
Pt was titrated from 4L to 1LNC sating low 90's. Pt is not able to follow command to use the flutter.BS remains the same post TXs

## 2022-11-05 NOTE — PROGRESS NOTES
[facility]    Consult Note - [facility]    [facility] 24/7 Contact Number: [phone]      Jennifer is currently eligible for inpatient hospice.    Spoke to Jennifer's son Eugenio (POA). Eugenio was relieved that is mother is [facility] hospice appropriate. Writer explained some of the hospice philosophy and tenets of hospice POC.   Eugenio would like to speak to the patient's hospitalist and Hem/Onco MDs after a plural catheter is placed on Monday 11/7/22. Once he has this information, he feels that he can make an informed decision on her next steps. Eugenio asked for [facility] phone and contact information to be sent to him via text which was done.    Plan: Writer scheduled a meeting with Eugenio and hospice CL at 2 PM on 11/7/22 (meeting at the patient's bedside) for a discussion of what GIP or home hospice could mean for Jennifer.     Eligibility Status Discussed with the Following:   - Nurse: Linette ALMAZAN, Charge RN Beau Rodriguez's Family/Preferred Contact: Son Eugenio    Stef Dailey RN BSN  [facility]

## 2022-11-05 NOTE — CONSULTS
Care Management Follow Up    Length of Stay (days): 4    Expected Discharge Date: 11/07/2022     Concerns to be Addressed:     Discharge planning, hospice care  Patient plan of care discussed at interdisciplinary rounds: Yes    Anticipated Discharge Disposition:  GIP vs facility with hospice   Anticipated Discharge Services:  24 hour care  Anticipated Discharge DME:  To be determined    Patient/family educated on Medicare website which has current facility and service quality ratings:    Education Provided on the Discharge Plan:  yes  Patient/Family in Agreement with the Plan:  Yes- spoke to son Khoa over the phone today    Referrals Placed by CM/SW:   Ludmila at [facility].  Private pay costs discussed: Not applicable    Additional Information:  Chart reviewed. SW spoke to patient's son Khoa over the phone. Khoa states and confirms plan for hospice care and is aware of hospice agency choice. He is agreeable to hospice consult. LASHAY also briefly explained [facility] hospice program at this hospital and he states he was made aware of it briefly by oncologist.   [facility] would also be [facility] if [facility] does not meet GIP requirements.   LASHAY left message for RN covering [facility] this weekend and sent referral to [facility].         FRANCI Burris

## 2022-11-05 NOTE — PLAN OF CARE
Problem: Gas Exchange Impaired  Goal: Optimal Gas Exchange  Outcome: Not Progressing     Problem: Dysrhythmia  Goal: Normalized Cardiac Rhythm  11/5/2022 0528 by Lawanda Salvador, RN  Outcome: Progressing  11/4/2022 2259 by Lawanda Salvador RN  Outcome: Not Progressing     Goal Outcome Evaluation:         Lungs very diminished. On 4L of oxygen per nasal cannula. Tachypneic with shallow respiration. Oxygen saturation above 90%. PRN oral morphine given. Went afib RVR tonight, 's. Got 500 bolus due to soft BP before giving PRN IV metoprolol. Gave a 5 mg IV metoprolol, converted back to normal sinus after.

## 2022-11-06 NOTE — PROGRESS NOTES
Dr ALFREDITO YIN was notified of 0 output with only sips of juice. Will do bladder scan as ordered.

## 2022-11-06 NOTE — PROGRESS NOTES
[facility]    Medicine Progress Note - Hospitalist Service    Date of Admission:  11/1/2022    Assessment & Plan   Jennifer Rashid is a 89 year old female with past medical history of recurrent lung cancer, recurrent right pleural effusion requiring thoracocentesis, HTN who presented to ED for evaluation of shortness of breath, found to have recurrent left pleural effusion and admitted for further management.    Patient had thoracocentesis on admission.  Patient also getting antibiotics per possible pneumonia.  However, patient condition not improving.  Palliative care team reported patient now DNR/DNI and no escalation of care to ICU.  On 11/5/2022, patient's primary oncologist, Dr. Gerard had detailed conversation with patient's son Mr. Becker. Reported given clinical context, family is in agreement with no further systemic therapy and pursuit of hospice.  Hospice service consulted.   Oncologist also reported that IR consult for placement of Pleurx catheter is reasonable to alleviate dyspnea at the end of the life.  Family is interested in inpatient hospice or transition to hospice facility.  Appreciated hospice care input.    Acute respiratory failure secondary to recurrent left pleural effusion;  Recurrent lung cancer;  Leukocytosis; likely multifactorial  --On admission CT chest negative for PE but reported Since 09/23/2022 PET/CT, there has been a mild to moderate increase in size of the loculated left pleural effusion causing an increase in the left lower lobe atelectasis. There has been an increase in atelectatic changes in the right lung most marked   in the right lower lobe.  --On admission status post thoracocentesis with removal of 0.8 L fluid.  Follow-up x-ray on 11/5 reported moderate to large loculated left pleural effusion has decreased slightly compared to imaging from 11/1.  --On IV antibiotic for presumed pneumonia  --Patient unable to do flutter valve or incentive  spirometry.    --Palliative care team added morphine for air hunger.  --O2 need down from 4 to 5 L to 2 L O2, titrate O2 as able.  -- Appreciated oncology input, recommended hospice, hospice consulted  -- IR consulted for Pleurx catheter placement for Monday.  Appreciated HUC input    Acute metabolic encephalopathy; likely multifactorial  -- CT head negative for acute intracranial process  --Normal TSH, B12 >4K, normal folate  -- Currently on one-to-one observation, continue  -- Continue clinical monitoring  --Hold home Remeron due to sedation.  Avoid sedatives    Hyperkalemia; uncertain etiology  -- Patient not acidotic, creatinine better than before.  Recheck improved    Essential hypertension;  -- PTA metoprolol    Chronic thrombocytopenia; likely from chemotherapy  --Trend down    Hypothyroidism;  -- TSH normal.  PTA Synthyroid    Goal of care; appreciated palliative care input.  No escalation of care to ICU       Diet: Regular Diet Adult    DVT Prophylaxis: Pneumatic Compression Devices, Ambulate every shift and no pharmacological agent given thrombocytopenia  Upton Catheter: Not present  Central Lines: None  Cardiac Monitoring: ACTIVE order. Indication: Electrolyte Imbalance (24 hours)- Magnesium <1.3 mg/ml; Potassium < =2.8 or > 5.5 mg/ml  Code Status: No CPR- Do NOT Intubate      Disposition Plan      Expected Discharge Date: 11/07/2022        Discharge Comments: Clinical progress, IV antibiotics, palliative care consult ,11/5 Sent Hospice consult        The patient's care was discussed with the Bedside Nurse, Care Coordinator/, Patient and Patient's Family.    Jose EDGE MD  Hospitalist Service  [facility]  Securely message with the Vocera Web Console (learn more here)  Text page via [employer] Paging/Directory         Clinically Significant Risk Factors        # Hyperkalemia: Highest K = 5.6 mmol/L (Ref range: 3.4-5.3) in last 2 days, will monitor as appropriate       #  "Hypoalbuminemia: Lowest albumin = 2.7 g/dL (Ref range: 3.5-5.2) at 11/5/2022  7:52 AM, will monitor as appropriate   # Thrombocytopenia: Lowest platelets = 69 (Ref range: 150-450) in last 2 days, will monitor for bleeding        # Cachexia: Estimated body mass index is 14.56 kg/m  as calculated from the following:    Height as of this encounter: 1.626 m (5' 4\").    Weight as of this encounter: 38.5 kg (84 lb 12.8 oz).          ______________________________________________________________________    Interval History   Patient seen and examined.  Notes, labs, imaging report personally reviewed.  Overnight events reviewed.  Appreciated house officer input.  Patient required bilateral mittens as patient was pulling tubes and lines.  Patient follows simple commands.  Cannot provide detailed reliable ROS.  Discussed with nursing staffs, reported saturation remains in good range unless patient pulls oxygen tube.  Patient's sons at bedside but did not have much questions.  Patient's other son Dr. Becker's primary  with whom I had detailed conversation yesterday  Discussed with HUC, reported [place] radiology was called for consult for Pleurx catheter placement for Monday.  We will keep patient n.p.o. after midnight.    Data reviewed today: I reviewed all medications, new labs and imaging results over the last 24 hours. I personally reviewed no images or EKG's today.    Physical Exam   Vital Signs: Temp: 97.9  F (36.6  C) Temp src: Axillary BP: 126/66 Pulse: 98   Resp: 23 SpO2: 92 % O2 Device: Nasal cannula Oxygen Delivery: 2 LPM  Weight: 84 lbs 12.8 oz    General: Not in obvious distress.  HEENT: Normocephalic, supple neck  Chest: Clear to auscultation bilateral anteriorly, no wheezing, scattered crackles  Heart: S1S2 normal, regular  Abdomen: Soft. NT, ND. Bowel sounds- active.  Extremities: No legs swelling  Neuro: Sleepy, keeps her eyes closed most of the time, follows simple commands, moves all " extremities      Data   Recent Labs   Lab 11/06/22  0520 11/05/22  0752 11/04/22  0907 11/03/22  0456 11/02/22  1312 11/02/22  0820 11/01/22  2114 11/01/22  1849 11/01/22  1849   WBC 15.4* 24.0*  --  10.7  --  14.2*  14.2*  --   --  19.7*   HGB 9.4* 9.8* 10.3* 11.2*  --  11.8  11.8  --   --  11.9   * 119*  --  116*  --  115*  115*  --   --  114*   PLT 69* 78* 91* 100*  --  124*  124*  --   --  147*   INR  --   --   --   --   --   --   --   --  1.42*   NA  --  140 140  --   --  141  --   --  142   POTASSIUM 4.8 5.6* 4.8  --   --  4.1  --   --  4.7   CHLORIDE  --  102 102  --   --  101  --   --  101   CO2  --  34* 35*  --   --  27  --   --  32*   BUN  --  12.6 11.8  --   --  17.6  --   --  17.1   CR  --  0.51 0.50*  --   --  0.66  --   --  0.64   ANIONGAP  --  4* 3*  --   --  13  --   --  9   MORRO  --  7.6* 7.8*  --   --  8.2  --   --  8.8   GLC  --  111* 90  --  85 81  --   --  104*   ALBUMIN  --  2.7* 2.7*  --   --  3.0*  --    < >  --    PROTTOTAL  --   --   --   --   --  6.6 6.4  --   --    BILITOTAL  --   --   --   --   --  0.4  --   --   --    ALKPHOS  --   --   --   --   --  120*  --   --   --    ALT  --   --   --   --   --  10  --   --   --    AST  --   --   --   --   --  27  --   --   --     < > = values in this interval not displayed.

## 2022-11-06 NOTE — PROGRESS NOTES
RESPIRATORY CARE NOTE  Patient is on 2LPM NC, BS diminished, gave Sodium chloride 3% treatment x1, BS post treatment no change, patient perceives mild improvement, patient tolerated treatment well.         Kalyan Alexander, RT

## 2022-11-06 NOTE — PLAN OF CARE
Problem: Palliative Care  Goal: Enhanced Quality of Life  Outcome: Progressing  Intervention: Maximize Comfort  Recent Flowsheet Documentation  Taken 11/6/2022 0056 by Gerri Mckeon RN  Pain Management Interventions: medication (see MAR)       Problem: Restraint, Nonviolent  Goal: Absence of Harm or Injury  Outcome: Progressing  Intervention: Protect Skin and Joint Integrity  Recent Flowsheet Documentation  Taken 11/6/2022 0315 by Gerri Mckeon RN  Body Position: weight shifting  Taken 11/6/2022 0056 by Gerri Mckeon RN  Body Position: (arms elevated)    supine, head elevated    other (see comments)       Problem: Gas Exchange Impaired  Goal: Optimal Gas Exchange  Outcome: Progressing  Intervention: Optimize Oxygenation and Ventilation  Recent Flowsheet Documentation  Taken 11/6/2022 0315 by Gerri Mckeon RN  Head of Bed (HOB) Positioning: HOB at 20 degrees  Taken 11/6/2022 0056 by Gerri Mckeon RN  Head of Bed (HOB) Positioning: HOB at 20 degrees     Goal Outcome Evaluation: 1-1 sitter at bedside for safety. Patient is disoriented to time and situation. Patient was restless around 0100. Patient also tachypneic and SOB. Prn PO morphine given. Patient pulling off O2 and attempting to pull out IV. Writer unable to redirect patient. Soft mitts on R and L hands applied to prevent patient from pulling lines. New order placed at 0115 by Dr. Santiago. Patient is on 2L O2 with SpO2 at 94%. Has SR with 1st degree AV block. Patient repositioned with assist x2 for comfort. Continue to monitor.

## 2022-11-06 NOTE — PHARMACY-ADMISSION MEDICATION HISTORY
Pharmacy Note - Admission Medication History     ______________________________________________________________________    Prior To Admission (PTA) med list completed and updated in EMR.       PTA Med List   Medication Sig Note Last Dose     Azelastine HCl 137 MCG/SPRAY SOLN Spray 1 spray in nostril daily  11/1/2022     escitalopram (LEXAPRO) 5 MG tablet Take 5 mg by mouth daily  11/1/2022     folic acid (FOLVITE) 1 MG tablet Take 1,000 mcg by mouth daily  11/1/2022     levothyroxine (SYNTHROID/LEVOTHROID) 88 MCG tablet Take 88 mcg by mouth daily before breakfast  11/1/2022     metoprolol succinate ER (TOPROL XL) 50 MG 24 hr tablet Take 25 mg by mouth daily 11/1/2022: 1/2 tablet 11/1/2022     mirtazapine (REMERON) 15 MG tablet Take 15 mg by mouth At Bedtime  10/31/2022     sennosides (SENOKOT) 8.6 MG tablet Take 8.6 mg by mouth 2 times daily as needed for constipation  Unknown at prn       Information source(s): Patient and CareEverywhere/SureScripts  Method of interview communication: in-person    Summary of Changes to PTA Med List  New: all    Patient was asked about OTC/herbal products specifically.  PTA med list reflects this.    In the past week, patient estimated taking medication this percent of the time:  greater than 90%.    Allergies were reviewed, assessed, and updated with the patient.      Patient did not bring any medications to the hospital and can't retrieve from home. No multi-dose medications are available for use during hospital stay.     The information provided in this note is only as accurate as the sources available at the time of the update(s).    Thank you,  Georgette Salvador, [facility]  11/1/2022 6:17 AM    Copied to correct note type on 11/6/22

## 2022-11-06 NOTE — PLAN OF CARE
Problem: Risk for Delirium  Goal: Improved Behavioral Control  Outcome: Progressing   Pt has been calm and cooperative.    Problem: Risk for Delirium  Goal: Improved Sleep  Outcome: Progressing   Sleeping most of morning, aroused to voice.     Problem: Gas Exchange Impaired  Goal: Optimal Gas Exchange  Outcome: Progressing   Goal Outcome Evaluation:       O2 sats greater than 90% on 2 L O2 per N/C.

## 2022-11-07 NOTE — CONSULTS
[facility]    Consult Note - [facility]    ______________________________________________________________________    [facility] 24/7 Contact Number: [phone]    - Providers: Please contact [facility] with changes in orders or clinical plan of care   - Nursing: Please contact [facility] with significant changes in patient condition    Hospice will notify the care team (including the hospitalist) to confirm date of inpatient hospice (GIP) admission.    New Epic encounter will not be created until hospice completes admission.   ______________________________________________________________________        Hospice Diagnosis: Acute Respiratory Failure    Indication for Inpatient Hospice: oxygen dependent    Goals for Hospital Discharge: Once stabilized will organize with family facility placement    Plan of Care Discussed with the Following:   - Nurse: Stella Langley  - Charge Nurse: Penny  - Hospitalist/Rounding Provider:       Jose EDGE MD    Hospitalist - Internal Medicine    Since 11/2/2022    [phone]       - Jennifer's Family/Preferred Contact: Mervin [phone]  - Hospice Provider: Dr. Andrade Choi    Summary of Visit (includes assessment, medications and any new orders):   Patient is very limited with communication and unable to comprehend hospice philosophy. She is wearing face mask and when removed she quickly desats to 67%. Morphine has been ordered PRN and family would like writer to reevaluate her condition once she has the thoracentesis. Son Eugenio who is an MD signed consents on behalf and lives in [place], [place]. Other son, Mervin also at bedside and lives very close to hospital. Spouse was not present during encounter as this would cause anxiety to family to have him present and sign consents.         In order to qualify for GIP IV antibiotics and continuous infusion needs to   be discontinued.  Continue morphine IV medications as PRN.        Gretchen Rocha, RN  [phone]

## 2022-11-07 NOTE — PROGRESS NOTES
[facility]    Medicine Progress Note - Hospitalist Service    Date of Admission:  11/1/2022    Assessment & Plan   Jennifer Rashid is a 89 year old female with past medical history of recurrent lung cancer, recurrent right pleural effusion requiring thoracocentesis, HTN who presented to ED for evaluation of shortness of breath, found to have recurrent left pleural effusion and admitted for further management.    Patient had thoracocentesis on admission.  Patient also getting antibiotics per possible pneumonia.  However, patient condition not improving.  Palliative care team reported patient now DNR/DNI and no escalation of care to ICU.  On 11/5/2022, patient's primary oncologist, Dr. Gerard had detailed conversation with patient's son Mr. Becker. Reported given clinical context, family is in agreement with no further systemic therapy and pursuit of hospice.  Hospice service consulted.   Oncologist also reported that IR consult for placement of Pleurx catheter is reasonable to alleviate dyspnea at the end of the life.  Family is interested in inpatient hospice or transition to hospice facility.  Appreciated hospice care input.    Acute respiratory failure secondary to recurrent left pleural effusion;  Recurrent lung cancer;  Leukocytosis; likely multifactorial  --On admission CT chest negative for PE but reported Since 09/23/2022 PET/CT, there has been a mild to moderate increase in size of the loculated left pleural effusion causing an increase in the left lower lobe atelectasis. There has been an increase in atelectatic changes in the right lung most marked   in the right lower lobe.  --On admission status post thoracocentesis with removal of 0.8 L fluid.  Follow-up x-ray on 11/5 reported moderate to large loculated left pleural effusion has decreased slightly compared to imaging from 11/1.  --On IV antibiotic for presumed pneumonia  --Patient unable to do flutter valve or incentive  spirometry.    --Palliative care team added morphine for air hunger.  --O2 need down from 4 to 5 L to 2 L O2, titrate O2 as able.  -- Appreciated oncology input, recommended hospice.  Hospice consult appreciated  -- IR consulted for Pleurx catheter placement for today    Acute metabolic encephalopathy; likely multifactorial  -- CT head negative for acute intracranial process  --Normal TSH, B12 >4K, normal folate  -- Currently on one-to-one observation, continue  -- Continue clinical monitoring  --Hold home Remeron due to sedation.  Avoid sedatives    Hyperkalemia; uncertain etiology  -- Patient not acidotic, creatinine better than before.  Recheck improved    Essential hypertension;  -- PTA metoprolol    Chronic thrombocytopenia; likely from chemotherapy  -- No active or obvious bleeding.  Trending down    Hypothyroidism;  -- TSH normal.  PTA Synthyroid    Goal of care; appreciated palliative care input.  No escalation of care to ICU         Diet: NPO per Anesthesia Guidelines for Procedure/Surgery Except for: Meds    DVT Prophylaxis: Pneumatic Compression Devices, Ambulate every shift and no pharmacological agent given worsening thrombocytopenia  Upton Catheter: Not present  Central Lines: None  Cardiac Monitoring: ACTIVE order. Indication: Electrolyte Imbalance (24 hours)- Magnesium <1.3 mg/ml; Potassium < =2.8 or > 5.5 mg/ml  Code Status: No CPR- Do NOT Intubate      Disposition Plan      Expected Discharge Date: 11/08/2022        Discharge Comments: Clinical progress, IV antibiotics, palliative care consult ,11/5 Sent Hospice consult          Jose EDGE MD  Hospitalist Service  [facility]  Securely message with the Vocera Web Console (learn more here)  Text page via [employer] Paging/Directory         Clinically Significant Risk Factors              # Hypoalbuminemia: Lowest albumin = 2.7 g/dL (Ref range: 3.5-5.2) at 11/5/2022  7:52 AM, will monitor as appropriate   # Thrombocytopenia: Lowest  "platelets = 69 (Ref range: 150-450) in last 2 days, will monitor for bleeding        # Cachexia: Estimated body mass index is 14.56 kg/m  as calculated from the following:    Height as of this encounter: 1.626 m (5' 4\").    Weight as of this encounter: 38.5 kg (84 lb 12.8 oz).          ______________________________________________________________________    Interval History   Patient seen and examined.  Notes, labs, imaging report personally reviewed.  Overnight events reviewed.  During my visit, patient sleeping, arousable, follows simple commands appropriately by nodding head.  Denied pain, denied short of breath.  Discussed with nursing staffs and requested to call IR again today to find out timing for Pleurx catheter placement.  Will discuss with palliative care team and family after procedure as anticipating likely transition to hospice    Data reviewed today: I reviewed all medications, new labs and imaging results over the last 24 hours. I personally reviewed no images or EKG's today.    Physical Exam   Vital Signs: Temp: 97.3  F (36.3  C) Temp src: Axillary BP: 124/61 Pulse: 100   Resp: 22 SpO2: 90 % O2 Device: Nasal cannula Oxygen Delivery: 3 LPM  Weight: 84 lbs 12.8 oz    General: Not in obvious distress.  HEENT: Normocephalic, supple neck  Chest: Clear to auscultation bilateral anteriorly, no wheezing, scattered crackles  Heart: S1S2 normal, regular  Abdomen: Soft. NT, ND. Bowel sounds- active.  Extremities: No legs swelling  Neuro: Sleeping, easily arousable by calling name, follows simple commands, moves all extremities        Data   Recent Labs   Lab 11/07/22  0805 11/07/22  0710 11/06/22  1911 11/06/22  0520 11/05/22  0752 11/04/22  0907 11/03/22  0456 11/02/22  1312 11/02/22  0820 11/01/22  2114 11/01/22  1849 11/01/22  1849   WBC  --   --   --  15.4* 24.0*  --  10.7  --  14.2*  14.2*  --   --  19.7*   HGB  --   --   --  9.4* 9.8* 10.3* 11.2*  --  11.8  11.8  --   --  11.9   MCV  --   --   --  " 115* 119*  --  116*  --  115*  115*  --   --  114*   PLT  --   --   --  69* 78* 91* 100*  --  124*  124*  --   --  147*   INR  --   --   --   --   --   --   --   --   --   --   --  1.42*   NA  --   --   --   --  140 140  --   --  141  --   --  142   POTASSIUM  --   --   --  4.8 5.6* 4.8  --   --  4.1  --   --  4.7   CHLORIDE  --   --   --   --  102 102  --   --  101  --   --  101   CO2  --   --   --   --  34* 35*  --   --  27  --   --  32*   BUN  --   --   --   --  12.6 11.8  --   --  17.6  --   --  17.1   CR  --   --   --   --  0.51 0.50*  --   --  0.66  --   --  0.64   ANIONGAP  --   --   --   --  4* 3*  --   --  13  --   --  9   MORRO  --   --   --   --  7.6* 7.8*  --   --  8.2  --   --  8.8   * 69* 73  --  111* 90  --    < > 81  --   --  104*   ALBUMIN  --   --   --   --  2.7* 2.7*  --   --  3.0*  --    < >  --    PROTTOTAL  --   --   --   --   --   --   --   --  6.6 6.4  --   --    BILITOTAL  --   --   --   --   --   --   --   --  0.4  --   --   --    ALKPHOS  --   --   --   --   --   --   --   --  120*  --   --   --    ALT  --   --   --   --   --   --   --   --  10  --   --   --    AST  --   --   --   --   --   --   --   --  27  --   --   --     < > = values in this interval not displayed.

## 2022-11-07 NOTE — PROGRESS NOTES
Pt appears to be resting comfortably, hands folded in prayer, eyes closed but listening. Jennifer nodded head yes and no to my inquiries. She is confident that she is going to heaven. She is in conversation with God and it is affirming. The CARE channel is appreciated.  Writer provided support through gentle affirmation of albino and person, and prayer.    Spiritual Care is available as needed or requested.    CONG Reilly.  Palliative Team

## 2022-11-07 NOTE — PROGRESS NOTES
Received call from hospice nurse, reported patient signed for hospice.  Patient transition to [facility] hospice  Hospice order set placed  Detail plan of care discussed with patient's son Dr. Becker.

## 2022-11-07 NOTE — PROGRESS NOTES
Interventional Radiology - Brief Note:  11/7/2022    Brief HPI: Jennifer Rashid is a 89 year old female with a PMH of HTN, thyroid cancer, and non-small cell lung cancer with recurrent pleural effusion requiring multiple thoracenteses who presented to [facility] ED with SOB on 11/01/2022. She was found to have left pleural effusion. Thoracentesis done (11/02, 0.8 L removed) and started on antibiotics. Per chart review, patient not improving and hospice consulted. LEFT tunneled pleural drainage catheter originally requested 11/07/2022 by Jose EDGE MD.    Per chart review, it appears she has had thoracenteses on the RIGHT side, most recently in 2021. She has only had one thoracentesis on her LEFT side, on 11/02. Chest x-ray on 11/05/2022 demonstrated a slight decrease in moderate to large loculated left pleural effusion. Of note, patient is significantly underweight at 38.5 kg, with minimal subcutaneous tissue.    ASSESSMENT/PLAN:   Discussed case with Dr. Bella, Dr. Gerard with Oncology, Dr. EDGE with Medicine, and Dr. Rai with Palliative Care. Also reviewed imaging with Dr. Bella. As she has only undergone one thoracentesis on her LEFT side, the benefit of placing a tunneled pleural catheter does not outweigh the risk at this time, as it may cause her more discomfort than help. Consult discontinued. Please reconsult IR should the need arise.    DEIDRA JASSO CNP  Interventional Radiology

## 2022-11-07 NOTE — PLAN OF CARE
Physical Therapy Discharge Summary    Reason for therapy discharge:    Patient/family request discontinuation of services. Pt signing on to hospice.     Progress towards therapy goal(s). See goals on Care Plan in [facility] electronic health record for goal details.  Goals not met.  Barriers to achieving goals:   limited tolerance for therapy.    Therapy recommendation(s):    No further therapy is recommended.      Anna Rai, PT, DPT  11/7/2022

## 2022-11-07 NOTE — PLAN OF CARE
"  Problem: Dysrhythmia  Goal: Normalized Cardiac Rhythm  Outcome: Progressing     Problem: Palliative Care  Goal: Enhanced Quality of Life  Outcome: Progressing     Problem: Restraint, Nonviolent  Goal: Absence of Harm or Injury  Outcome: Met  Intervention: Protect Skin and Joint Integrity  Recent Flowsheet Documentation  Taken 11/7/2022 0055 by Gerri Mckeon RN  Body Position:    weight shifting    legs elevated       Goal Outcome Evaluation: Patient has been calm and cooperative. Patent off 1 to 1 and restraints discontinued. Patient is disoriented to time and situation. Patient's lethargic and arouses with speech/gentle shaking. Mostly answering \"yes\" and \"no\" questions. Patient has SR with 1st degree AV block. VSS. On 2-3L oxygen at night. SpO2 between 94-99%. Patient repositioned with assist x2 for comfort. NPO since midnight. Plan for pleurx catheter placement today. BG 69 this morning. Endorsed to Day RN.      "

## 2022-11-07 NOTE — PHARMACY-ADMISSION MEDICATION HISTORY
Admission medication history completed at [facility]. Please see Pharmacy - Admission Medication History note from 11/01/2022.

## 2022-11-07 NOTE — PLAN OF CARE
Problem: Risk for Delirium  Goal: Optimal Coping  Outcome: Progressing   Goal Outcome Evaluation:       Turned and repositioned every two hours. Denies pain when asked.    Problem: Gas Exchange Impaired  Goal: Optimal Gas Exchange  Outcome: Progressing  Pt O2 level dropped to low 80's with O2 at 3L per n/c.  Placed pt on oximask at 3L .   O2 level back to 94-95%.    Problem: Oral Intake Altered (Oncology Care)  Goal: Optimal Oral Intake  Outcome: Progressing   NPO for procedure. Iv fluids were started

## 2022-11-07 NOTE — PLAN OF CARE
Occupational Therapy Discharge Summary    Reason for therapy discharge:    Pt signing onto hospice. Wiil d/c OT services    Progress towards therapy goal(s). See goals on Care Plan in Epic electronic health record for goal details.  Hospice care    Therapy recommendation(s):    No further therapy is recommended.    Goal Outcome Evaluation:        Mary Reyes, OTR/L  11/7/2022

## 2022-11-07 NOTE — DISCHARGE SUMMARY
[facility] MEDICINE  DISCHARGE SUMMARY     Primary Care Physician: Jeniffer April  Admission Date: 11/1/2022   Discharge Provider: Jose EDGE MD Discharge Date: 11/7/2022   Diet:   Active Diet and Nourishment Order   Procedures     NPO per Anesthesia Guidelines for Procedure/Surgery Except for: Meds       Code Status: No CPR- Do NOT Intubate   Activity: DCACTIVITY: Activity as tolerated        Condition at Discharge: Stable     REASON FOR PRESENTATION(See Admission Note for Details)   Shortness of breath    PRINCIPAL & ACTIVE DISCHARGE DIAGNOSES     Principal Problem:    Acute respiratory failure with hypoxia (H)  Active Problems:    Benign essential hypertension    Non-small cell lung cancer (NSCLC) (H)    Hypothyroidism    Recurrent pleural effusion    Hypoxia    Leukocytosis, unspecified type    Thrombocytopenia (H)      PENDING LABS     Unresulted Labs Ordered in the Past 30 Days of this Admission     No orders found from 10/2/2022 to 11/2/2022.            PROCEDURES ( this hospitalization only)          RECOMMENDATIONS TO OUTPATIENT PROVIDER FOR F/U VISIT       Patient readmitted for [facility] hospice      DISPOSITION     [facility]    SUMMARY OF HOSPITAL COURSE:      Jennifer Rashid is a 89 year old female with past medical history of recurrent lung cancer, recurrent right pleural effusion requiring thoracocentesis, HTN who presented to ED for evaluation of shortness of breath, found to have pleural effusion and admitted for further management.    Patient had thoracocentesis x2.  Patient followed by palliative care team and transition to DNR/DNI and no escalation of care.  Patient followed by oncology team who had called patient's another family member and recommended hospice.      Hospice service was consulted and patient signed for [facility] hospice on 11/7.  Patient discharged and readmitted [facility] under [facility] hospice.   Please refer to progress note from  today for details.       Discharge Medications with Med changes:     Discharge Medication List as of 11/7/2022  4:39 PM      CONTINUE these medications which have NOT CHANGED    Details   Azelastine HCl 137 MCG/SPRAY SOLN Spray 1 spray in nostril daily, Historical      escitalopram (LEXAPRO) 5 MG tablet Take 5 mg by mouth daily, Historical      folic acid (FOLVITE) 1 MG tablet Take 1,000 mcg by mouth daily, Historical      levothyroxine (SYNTHROID/LEVOTHROID) 88 MCG tablet Take 88 mcg by mouth daily before breakfast, Historical      metoprolol succinate ER (TOPROL XL) 50 MG 24 hr tablet Take 25 mg by mouth daily, Historical      mirtazapine (REMERON) 15 MG tablet Take 15 mg by mouth At Bedtime, Historical      sennosides (SENOKOT) 8.6 MG tablet Take 8.6 mg by mouth 2 times daily as needed for constipation, Historical                   Rationale for medication changes:              Consults       OCCUPATIONAL THERAPY ADULT IP CONSULT  PHYSICAL THERAPY ADULT IP CONSULT  HEMATOLOGY & ONCOLOGY IP CONSULT  CARE MANAGEMENT / SOCIAL WORK IP CONSULT  PALLIATIVE CARE ADULT IP CONSULT  CARE MANAGEMENT / SOCIAL WORK IP CONSULT  SPIRITUAL HEALTH SERVICES IP CONSULT  INPATIENT HOSPICE ADULT CONSULT  INTERVENTIONAL RADIOLOGY ADULT/PEDS IP CONSULT  INPATIENT HOSPICE ADULT CONSULT    Immunizations given this encounter     Most Recent Immunizations   Administered Date(s) Administered     COVID-19,PF,Moderna 11/13/2021           Anticoagulation Information      Recent INR results:   Recent Labs   Lab 11/01/22  1849   INR 1.42*         SIGNIFICANT IMAGING FINDINGS     Results for orders placed or performed during the hospital encounter of 11/01/22   CT Chest Pulmonary Embolism w Contrast    Impression    IMPRESSION:  1.  Since 09/23/2022 PET/CT, there has been a mild to moderate increase in size of the loculated left pleural effusion causing an increase in the left lower lobe atelectasis. There has been an increase in atelectatic  changes in the right lung most marked   in the right lower lobe.    2.  No PE, dissection, or aneurysm.    3.  Changes of known neoplasm and sequelae from neoplastic treatments.   US Thoracentesis    Impression    IMPRESSION:  Status post left ultrasound-guided thoracentesis.    Reference CPT Code: 41537   CT Head w/o Contrast    Impression    IMPRESSION:  1.  No CT evidence for acute intracranial process. Please note evaluation for metastatic disease is limited given the lack of intravenous contrast.  2.  Brain atrophy and presumed chronic microvascular ischemic changes as above.   XR Chest Port 1 View    Impression    IMPRESSION: Moderate to large loculated left pleural effusion has decreased slightly since 11/01/2022. Small right pleural effusion unchanged. Bilateral parahilar and medial bibasilar consolidative opacities unchanged. No pneumothorax.   US Thoracentesis    Impression    IMPRESSION:  Status post left ultrasound-guided thoracentesis.    Reference CPT Code: 13324   Echocardiogram Complete   Result Value Ref Range    LVEF  60-65%        SIGNIFICANT LABORATORY FINDINGS     Most Recent 3 CBC's:Recent Labs   Lab Test 11/06/22  0520 11/05/22  0752 11/04/22  0907 11/03/22  0456   WBC 15.4* 24.0*  --  10.7   HGB 9.4* 9.8* 10.3* 11.2*   * 119*  --  116*   PLT 69* 78* 91* 100*     Most Recent 3 BMP's:Recent Labs   Lab Test 11/07/22  1140 11/07/22  0805 11/07/22  0710 11/06/22  1911 11/06/22  0520 11/05/22  0752 11/04/22  0907 11/02/22  1312 11/02/22  0820   NA  --   --   --   --   --  140 140  --  141   POTASSIUM  --   --   --   --  4.8 5.6* 4.8  --  4.1   CHLORIDE  --   --   --   --   --  102 102  --  101   CO2  --   --   --   --   --  34* 35*  --  27   BUN  --   --   --   --   --  12.6 11.8  --  17.6   CR  --   --   --   --   --  0.51 0.50*  --  0.66   ANIONGAP  --   --   --   --   --  4* 3*  --  13   MORRO  --   --   --   --   --  7.6* 7.8*  --  8.2   * 172* 69*   < >  --  111* 90   < > 81    < >  = values in this interval not displayed.       Discharge Orders     No discharge procedures on file.    Examination   Physical Exam   Temp:  [97.3  F (36.3  C)-98  F (36.7  C)] 97.3  F (36.3  C)  Pulse:  [] 100  Resp:  [16-22] 22  BP: (123-130)/(61-66) 124/61  SpO2:  [89 %-100 %] 90 %  Wt Readings from Last 1 Encounters:   11/06/22 38.5 kg (84 lb 12.8 oz)       Please refer to my progress note from today for details.  Discussed with interventional radiology team, palliative care team, hospice care team.  Also had conversation with patient's son.      Please see EMR for more detailed significant labs, imaging, consultant notes etc.    IJose MD, personally saw the patient today and spent greater than 30 minutes discharging this patient.    Jose EDGE MD  [facility]    CC:Jeniffer, April

## 2022-11-07 NOTE — PROGRESS NOTES
"    Progress Note     Primary Oncologist/Hematologist:  Dr. 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 Family is in agreement with no further systemic treatment and pursuit of hospice. Hospice has been consulted.  Patient is likely to transition to comfort cares with inpatient hospice. Tunneled pleural drainage catheter was requested however not recommended by IR, which I agree would not provide her benefit at this time and likely more pain/agitation. At this time, no further recommendations from oncology standpoint. Please call with questions/concerns.     Sandra Marrero [facility]  [phone] (office)          Interval History:     Jennifer is seen today at bedside. No family is present. She is restless and taking off hospital gown, oxygen and repeatedly saying \"im hot, just kill me\". Oxygen was reapplied several times throughout my visit.               Review of Systems:     The 5 point Review of Systems is negative other than noted in the HPI             Medications:   Scheduled Medications    Azelastine HCl  1 spray Nasal Daily     cefTRIAXone  2 g Intravenous Q24H     escitalopram  5 mg Oral Daily     folic acid  400 mcg Oral Daily     levothyroxine  88 mcg Oral QAM AC     metoprolol succinate ER  25 mg Oral Daily     [Held by provider] mirtazapine  15 mg Oral At Bedtime     sodium chloride (PF)  10-20 mL Intracatheter Q28 Days     sodium polystyrene  15 g Oral Once     PRN Medications  acetaminophen, alum & mag hydroxide-simethicone, sore throat, benzonatate, bisacodyl, bisacodyl, glucose **OR** dextrose **OR** glucagon, guaiFENesin, " "ipratropium - albuterol 0.5 mg/2.5 mg/3 mL, magnesium hydroxide, metoprolol, morphine, morphine, naloxone **OR** naloxone **OR** naloxone **OR** naloxone, senna-docusate, sodium chloride (PF), sodium chloride (PF)               Physical Exam:   Vitals were reviewed  Blood pressure 124/61, pulse 100, temperature 97.3  F (36.3  C), temperature source Axillary, resp. rate 22, height 1.626 m (5' 4\"), weight 38.5 kg (84 lb 12.8 oz), SpO2 90 %.  Wt Readings from Last 4 Encounters:   11/06/22 38.5 kg (84 lb 12.8 oz)       I/O last 3 completed shifts:  In: 400 [P.O.:50; I.V.:350]  Out: 250 [Urine:250]    GENERAL:  Frail, cachectic.   LUNGS: Tachypneic, shallow respirations.   EXTREMITIES: Without edema or cyanosis.  SKIN: Skin is intact without rash, erythema, petechiae, or ecchymosis.  PSYCH: Restless, moaning in bed.            Data:   All laboratory data and imaging studies reviewed.    CMP  Recent Labs   Lab 11/07/22  1140 11/07/22  0805 11/07/22  0710 11/06/22  1911 11/06/22  0520 11/05/22  0752 11/05/22  0214 11/04/22  0907 11/03/22  0456 11/02/22  1312 11/02/22  0820 11/01/22  2114 11/01/22  1849   NA  --   --   --   --   --  140  --  140  --   --  141  --  142   POTASSIUM  --   --   --   --  4.8 5.6*  --  4.8  --   --  4.1  --  4.7   CHLORIDE  --   --   --   --   --  102  --  102  --   --  101  --  101   CO2  --   --   --   --   --  34*  --  35*  --   --  27  --  32*   ANIONGAP  --   --   --   --   --  4*  --  3*  --   --  13  --  9   * 172* 69* 73  --  111*  --  90  --    < > 81  --  104*   BUN  --   --   --   --   --  12.6  --  11.8  --   --  17.6  --  17.1   CR  --   --   --   --   --  0.51  --  0.50*  --   --  0.66  --  0.64   GFRESTIMATED  --   --   --   --   --  88  --  89  --   --  83  --  83   MORRO  --   --   --   --   --  7.6*  --  7.8*  --   --  8.2  --  8.8   MAG  --   --   --   --   --   --  1.9 2.1 2.5*  --  1.7  --   --    PHOS  --   --   --   --   --  2.8  --  2.2*  --   --   --   --   --  "   PROTTOTAL  --   --   --   --   --   --   --   --   --   --  6.6 6.4  --    ALBUMIN  --   --   --   --   --  2.7*  --  2.7*  --   --  3.0*  --   --    BILITOTAL  --   --   --   --   --   --   --   --   --   --  0.4  --   --    ALKPHOS  --   --   --   --   --   --   --   --   --   --  120*  --   --    AST  --   --   --   --   --   --   --   --   --   --  27  --   --    ALT  --   --   --   --   --   --   --   --   --   --  10  --   --     < > = values in this interval not displayed.     CBC  Recent Labs   Lab 11/06/22  0520 11/05/22  0752 11/04/22  0907 11/03/22  0456 11/02/22  0820   WBC 15.4* 24.0*  --  10.7 14.2*  14.2*   RBC 2.65* 2.76*  --  3.22* 3.30*  3.30*   HGB 9.4* 9.8* 10.3* 11.2* 11.8  11.8   HCT 30.4* 32.9*  --  37.2 38.0  38.0   * 119*  --  116* 115*  115*   MCH 35.5* 35.5*  --  34.8* 35.8*  35.8*   MCHC 30.9* 29.8*  --  30.1* 31.1*  31.1*   RDW 18.5* 18.5*  --  18.8* 19.1*  19.1*   PLT 69* 78* 91* 100* 124*  124*     INR  Recent Labs   Lab 11/01/22  1849   INR 1.42*     Data   Results for orders placed or performed during the hospital encounter of 11/01/22 (from the past 24 hour(s))   Glucose by meter   Result Value Ref Range    GLUCOSE BY METER POCT 73 70 - 99 mg/dL   Glucose by meter   Result Value Ref Range    GLUCOSE BY METER POCT 69 (L) 70 - 99 mg/dL   Glucose by meter   Result Value Ref Range    GLUCOSE BY METER POCT 172 (H) 70 - 99 mg/dL   Glucose by meter   Result Value Ref Range    GLUCOSE BY METER POCT 124 (H) 70 - 99 mg/dL

## 2022-11-07 NOTE — PROGRESS NOTES
[facility]  Palliative Care Daily Progress Note       Recommendations & Counseling      Anticipate significant symptom burden once fully on comfort care given degree of hypoxia noted when Ree takes off oxygen.  Appropriate for [facility] hospice evaluation and appreciate [facility] hospice support to Jennifer, her  Salvatore and their children.     Goals of care: currently life-prolonging and transitioning toward comfort-focused goals of treatment.  SHE IS NOT ON COMFORT CARE but likely to do so with transition to [facility].      Advanced care planning:HCD found from [employer] system and uploaded to merged Epic.  Names  Salvatore as primary agent, son Eugenio as first alternate.   Eugenio noted yesterday Jennifer told him she wanted him to be primary surrogate.  Jennifer confirmed 11/4 that she wants Eugenio as primary agent.  DNR/DNI and no escalation of cares to ICU.  Recommend POLST prior to discharge (DNR/Comfort).     Support: Spiritual >> [employer].   to Salvatore; they recently moved into [facility] and Jennifer is on a wait list for memory care at the facility.  They have four children, one son in town, an intellectually disabled daughter (Gretchen) who resides in a group home, two sons out of [facility].  Having meeting for [facility] hospice today with [facility].        Symptoms:  1. Dyspnea due to advanced cancer, R and L (currently L is newest, larger) pleural effusion, and possible underlying pneumonia.  - Jennifer wishes to try an opioid PRN for air hunger.  I ordered prn morphine (PO should be first line, IV second line).  Would use 3mg oral morphine (could titrate higher but wanted to start low given sarcopenia and frailty and advanced age) and this has been working well.  - had evidence of continued mod-large pleural effusion on L side on follow up xray 11/05; discussed with DEIDRA Melo who noted IR Dr Bella noted that patients with limited life expectancy  and cachectic body habitus may have increased pain from lack of soft tissue to pad/cushion a tunneled catheter.  I discussed this concern with Jennifer's son and recommended repeat therapeutic thoracentesis on L side, but would consider holding off PleurX as don't want to cause more pain than Jennifer is currently having (she has minimal pain).  He agrees.  If continue issues with recurrent pleural effusion could reconsider in days ahead but unclear this will affect life expectancy.         2. Delirium, concern for underlying cognitive impairment, possibly dementia.  - not agitated but continues pulling off o2 at times.  - recommend minimizing tethers, stop telemetry but can continue continuous pulse ox monitoring.  - d/w son Eugenio at some point would stop continuous pulse ox and titrate O2 based on comfort (and leaving off if she wants it off) and using opioids to manage air hunger symptoms.     3. Cancer cachexia, advanced.  - diet as tolerated for comfort.        Assessments                Jennifer Rashid is a 89 year old female with R sided non-small cell lung cancer (diagnosed in 2018, follows with [facility]/Dr Gerard and for a time with [facility] but graduated back to Dr Gerard in summer 2022).  She presented with loose stools/diarrhea x 3 days and dyspnea for a week and worsening weakness and found to have delirium, leukocytosis (treating for possible pneumonia) in context of falls and cognitive impairment and possible dementia       Today, the patient was seen for:  Symptom management, goals of care    Prognosis, Goals, or Advance Care Planning was addressed today with: Yes.  Mood, coping, and/or meaning in the context of serious illness were addressed today: Yes.              Interval History:     Chart review/discussion with unit or clinical team members:   Discussed with NORAH Langley, [facility] MD Dr EDGE.  Weekend reviewed, able to engage in conversation, does take off O2 at times.  Drops to upper 60% sats  when taking off O2.  Otherwise has been comfortable.  Minimal oral intake.    Per patient or family/caregivers today:  Denies pain, denies air hunger.  Ree endorses feeling at peace with her life.                Review of Systems:     Denies chest pain, denies nausea.  Endorses feeling tired.          Medications:     I have reviewed this patient's medication profile and medications during this hospitalization.    Noted meds:    Current Facility-Administered Medications   Medication     acetaminophen (TYLENOL) tablet 650 mg     alum & mag hydroxide-simethicone (MAALOX) suspension 30 mL     Azelastine HCl SOLN 1 spray     benzocaine-menthol (CEPACOL) 15-3.6 MG lozenge 1 lozenge     benzonatate (TESSALON) capsule 100 mg     bisacodyl (DULCOLAX) EC tablet 5 mg     bisacodyl (DULCOLAX) suppository 10 mg     cefTRIAXone (ROCEPHIN) 2 g vial to attach to  ml bag for ADULTS or NS 50 ml bag for PEDS     dextrose 5% and 0.9% NaCl infusion     glucose gel 15-30 g    Or     dextrose 50 % injection 25-50 mL    Or     glucagon injection 1 mg     escitalopram (LEXAPRO) tablet 5 mg     folic acid (FOLVITE) tablet 400 mcg     guaiFENesin (ROBITUSSIN) 20 mg/mL solution 10 mL     ipratropium - albuterol 0.5 mg/2.5 mg/3 mL (DUONEB) neb solution 3 mL     levothyroxine (SYNTHROID/LEVOTHROID) tablet 88 mcg     magnesium hydroxide (MILK OF MAGNESIA) suspension 30 mL     metoprolol (LOPRESSOR) injection 5 mg     metoprolol succinate ER (TOPROL XL) 24 hr tablet 25 mg     [Held by provider] mirtazapine (REMERON) tablet 15 mg     morphine (PF) injection 1 mg     morphine solution 3 mg     naloxone (NARCAN) injection 0.2 mg    Or     naloxone (NARCAN) injection 0.4 mg    Or     naloxone (NARCAN) injection 0.2 mg    Or     naloxone (NARCAN) injection 0.4 mg     senna-docusate (SENOKOT-S/PERICOLACE) 8.6-50 MG per tablet 1 tablet     sodium chloride (PF) 0.9% PF flush 10-20 mL     sodium chloride (PF) 0.9% PF flush 10-20 mL     sodium  "chloride (PF) 0.9% PF flush 10-20 mL     sodium polystyrene (KAYEXALATE) suspension 15 g   24-hr MME: 6mg OME             Physical Exam:   Vital Signs: Blood pressure 124/61, pulse 100, temperature 97.3  F (36.3  C), temperature source Axillary, resp. rate 22, height 1.626 m (5' 4\"), weight 38.5 kg (84 lb 12.8 oz), SpO2 90 %.   GENERAL: sleepy 89 yo woman, cachectic.  Provides one-word answers and nods/shakes head no to questions.    SKIN: Warm and dry   HEENT: Normocephalic, anicteric sclera, moist mucous membranes, marked temporal muscle wasting.  LUNGS: Clear to auscultation anterolaterally; non-labored   CARDIAC: RRR, normal s1/s2, w/o m/r/g   ABDOMINAL: BS (+), soft, non distended, non tender  MUSKL: no gross joint deformities   EXTREMITIES: No edema or cyanosis, pulses 2+ and symmetrical  NEUROLOGIC: oriented to place, self.  PSYCH: calm, sleepy.             Data Reviewed:     Reviewed recent pertinent imaging:   CXR portable 11/5/22:  IMPRESSION: Moderate to large loculated left pleural effusion has decreased slightly since 11/01/2022. Small right pleural effusion unchanged. Bilateral parahilar and medial bibasilar consolidative opacities unchanged. No pneumothorax.    Reviewed recent labs:   Pathology report reviewed and back: confirms L pleural effusion is malignant.  Reviewed with sonEugenio.    Last Comprehensive Metabolic Panel:  Sodium   Date Value Ref Range Status   11/05/2022 140 136 - 145 mmol/L Final     Potassium   Date Value Ref Range Status   11/06/2022 4.8 3.4 - 5.3 mmol/L Final   09/24/2021 3.9 3.5 - 5.0 mmol/L Final     Chloride   Date Value Ref Range Status   11/05/2022 102 98 - 107 mmol/L Final   09/24/2021 102 98 - 107 mmol/L Final     Carbon Dioxide (CO2)   Date Value Ref Range Status   11/05/2022 34 (H) 22 - 29 mmol/L Final   09/24/2021 28 22 - 31 mmol/L Final     Anion Gap   Date Value Ref Range Status   11/05/2022 4 (L) 7 - 15 mmol/L Final   09/24/2021 11 5 - 18 mmol/L Final     Glucose "   Date Value Ref Range Status   09/24/2021 66 (L) 70 - 125 mg/dL Final     GLUCOSE BY METER POCT   Date Value Ref Range Status   11/07/2022 124 (H) 70 - 99 mg/dL Final     Urea Nitrogen   Date Value Ref Range Status   11/05/2022 12.6 8.0 - 23.0 mg/dL Final   09/24/2021 15 8 - 28 mg/dL Final     Creatinine   Date Value Ref Range Status   11/05/2022 0.51 0.51 - 0.95 mg/dL Final     GFR Estimate   Date Value Ref Range Status   11/05/2022 88 >60 mL/min/1.73m2 Final     Comment:     Effective December 21, 2021 eGFRcr in adults is calculated using the 2021 CKD-EPI creatinine equation which includes age and gender (Jada et al., NEJ, DOI: 10.1056/MBFQcs1254366)   04/10/2021 >60 >60 mL/min/1.73m2 Final     Calcium   Date Value Ref Range Status   11/05/2022 7.6 (L) 8.2 - 9.6 mg/dL Final     CBC RESULTS: Recent Labs   Lab Test 11/06/22  0520   WBC 15.4*   RBC 2.65*   HGB 9.4*   HCT 30.4*   *   MCH 35.5*   MCHC 30.9*   RDW 18.5*   PLT 69*           Liza Rai MD  [facility] Palliative Medicine Service: [facility]  Department voice mail (checked M-F 8a-4pm approx): [phone]  [facility] Palliative Medicine pager: [phone]  (Please use [employer] for text paging if possible, use link under Palliative service)  May page me directly via [employer]

## 2022-11-08 NOTE — PROGRESS NOTES
Arouses to voice. Took few sips of water. She was having difficulty eating applesauce.  Stopped feeding pt and oral cares provided

## 2022-11-08 NOTE — PLAN OF CARE
Problem: End-of-Life Care  Goal: Comfort, Peace and Preserved Dignity  Outcome: Progressing  Intervention: Promote Physical Comfort  Recent Flowsheet Documentation  Taken 11/7/2022 2134 by Darius Bourgeois RN  Airway/Ventilation Support: comfort measures provided  Sensory Stimulation Regulation:   auditory stimulation minimized   music on   quiet environment promoted  Environmental Support:   comfort object encouraged   calm environment promoted   caregiver consistency promoted   distractions minimized   environmental consistency promoted   personal routine supported   rest periods encouraged  Intervention: Promote Peace and Maintain Dignity  Recent Flowsheet Documentation  Taken 11/7/2022 2134 by Darius Bourgeois RN  Spiritual Activities Assistance: hope instilled  Supportive Measures:   active listening utilized   counseling provided   decision-making supported   goal-setting facilitated  Complementary Therapy:   music therapy provided   essential oils utilized  Taken 11/7/2022 2130 by Darius Bourgeois RN  Complementary Therapy:   music therapy provided   massage therapy performed  Intervention: Support the Grieving Process  Recent Flowsheet Documentation  Taken 11/7/2022 2134 by Darius Bourgeois RN  Family/Support System Care:   caregiver stress acknowledged   family care conference arranged   involvement promoted  Life Transition/Adjustment: end-of-life care initiated   Goal Outcome Evaluation:  Reposition Q 2 hrs/PRN.  Monitor sat's via pulse oximetry.  Comfort cares.  Admin analgesic as indicated.  Notify family of any change in physical status.

## 2022-11-08 NOTE — PLAN OF CARE
Goal Outcome Evaluation:      Plan of Care Reviewed With: child      Pt turned and repositioned every 2 hrs. Freq oral cares done. Taking sips of water and requested applesauce which she did eat. Denies pain, oral morphine given times one for RR of 30. Attempting to keep O2 sats up to 90 but pt takes off n/c and mask frequently.

## 2022-11-08 NOTE — H&P
"[facility]    History and Physical - Hospitalist Service       Date of Admission:  11/7/2022    Assessment & Plan      Jennifer Rashid is a 89 year old female with past medical history of recurrent lung cancer, recurrent right pleural effusion requiring thoracocentesis, HTN who presented to ED for evaluation of shortness of breath, found to have large left pleural effusion.  Patient had thoracocentesis x2.  Patient thought to be not a candidate for Pleurx catheter per IR.  Oncology, palliative care team and hospice consulted.  Patient's family decided for hospice.  Patient transition to [facility] hospice and discharge-readmitted to [facility].      Recurrent lung cancer;  Left malignant pleural effusion s/p thoracocentesis x2;  Acute metabolic encephalopathy; multifactorial  Probable pneumonia;  Hospice care order set placed        Diet: Regular Diet Adult    DVT Prophylaxis: None, patient on hospice care  Upton Catheter: Not present  Central Lines: None  Cardiac Monitoring: None  Code Status: No CPR- Do NOT Intubate      Clinically Significant Risk Factors Present on Admission                      # Cachexia: Estimated body mass index is 14.56 kg/m  as calculated from the following:    Height as of 11/2/22: 1.626 m (5' 4\").    Weight as of 11/6/22: 38.5 kg (84 lb 12.8 oz).           Disposition Plan      Expected Discharge Date: 11/09/2022                The patient's care was discussed with the Bedside Nurse, Care Coordinator/, Patient's Family and Hospice team.    Jose EDGE MD  Hospitalist Service  [facility]  Securely message with the Vocera Web Console (learn more here)  Text page via [employer] Paging/Directory         ______________________________________________________________________    Chief Complaint   Transition to hospice care    Patient known to me from previous hospitalization.     History of Present Illness   Jennifer Rashid is a " 89 year old female who Jennifer Rashid is a 89 year old female with past medical history of recurrent lung cancer, recurrent right pleural effusion requiring thoracocentesis, HTN who presented to ED for evaluation of shortness of breath, found to have large left pleural effusion.  Patient had thoracocentesis x2.  Oncology, palliative care team and hospice consulted.  Patient family decided for hospice.  Patient transition to [facility] hospice admission at [facility].    Patient lying in the bed, looks comfortable,, sleepy.  Discussed with nursing staffs, reported patient does frequently pulling off her oxygen mask and saturation dropping to 70 to 80s.  However, saturation returns to above 90 with reapplication of supplemental oxygen.    Review of Systems    Patient cannot provide detailed reliable history due to encephalopathy    Past Medical History    I have reviewed this patient's medical history and updated it with pertinent information if needed.   Past Medical History:   Diagnosis Date     Fibrocystic breast      Hypertension      Non-small cell lung cancer (H)      Thyroid cancer (H) 1975       Past Surgical History   I have reviewed this patient's surgical history and updated it with pertinent information if needed.  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  Social History     Tobacco Use     Smoking status: Never     Smokeless tobacco: Never   Substance Use Topics     Alcohol use: Yes     Comment: Alcoholic Drinks/day: Occasional glass of wine every several weeks     Drug  use: No       Family History   I have reviewed this patient's family history and updated it with pertinent information if needed.  Family History   Problem Relation Age of Onset     Colon Cancer Son 45.00     Breast Cancer No family hx of        Prior to Admission Medications   Prior to Admission Medications   Prescriptions Last Dose Informant Patient Reported? Taking?   Azelastine HCl 137 MCG/SPRAY SOLN   Yes No   Sig: Spray 1 spray in nostril daily   escitalopram (LEXAPRO) 5 MG tablet   Yes No   Sig: Take 5 mg by mouth daily   folic acid (FOLVITE) 1 MG tablet   Yes No   Sig: Take 1,000 mcg by mouth daily   levothyroxine (SYNTHROID/LEVOTHROID) 88 MCG tablet   Yes No   Sig: Take 88 mcg by mouth daily before breakfast   metoprolol succinate ER (TOPROL XL) 50 MG 24 hr tablet   Yes No   Sig: Take 25 mg by mouth daily   mirtazapine (REMERON) 15 MG tablet   Yes No   Sig: Take 15 mg by mouth At Bedtime   sennosides (SENOKOT) 8.6 MG tablet   Yes No   Sig: Take 8.6 mg by mouth 2 times daily as needed for constipation      Facility-Administered Medications: None     Allergies   Allergies   Allergen Reactions     Penicillins Rash       Physical Exam   Vital Signs:                    Weight: 0 lbs 0 oz      General: Not in obvious distress.  HEENT: Normocephalic, supple neck  Chest: Clear to auscultation bilateral anteriorly, no wheezing, decreased breath sound at left lung field  Heart: S1S2 normal, regular  Abdomen: Soft.  No grimace on abdominal palpation. Bowel sounds- active.  Extremities: No legs swelling  Neuro: Sleepy, arousable to verbal stimuli, moves all extremities      Data   Data reviewed today: I reviewed all medications, new labs and imaging results over the last 24 hours. I personally reviewed .    Recent Labs   Lab 11/07/22  1140 11/07/22  0805 11/07/22  0710 11/06/22  1911 11/06/22  0520 11/05/22  0752 11/04/22  0907 11/03/22  0456 11/02/22  1312 11/02/22  0820 11/01/22  2114 11/01/22  1849 11/01/22  1849    WBC  --   --   --   --  15.4* 24.0*  --  10.7  --  14.2*  14.2*  --   --  19.7*   HGB  --   --   --   --  9.4* 9.8* 10.3* 11.2*  --  11.8  11.8  --   --  11.9   MCV  --   --   --   --  115* 119*  --  116*  --  115*  115*  --   --  114*   PLT  --   --   --   --  69* 78* 91* 100*  --  124*  124*  --   --  147*   INR  --   --   --   --   --   --   --   --   --   --   --   --  1.42*   NA  --   --   --   --   --  140 140  --   --  141  --   --  142   POTASSIUM  --   --   --   --  4.8 5.6* 4.8  --   --  4.1  --   --  4.7   CHLORIDE  --   --   --   --   --  102 102  --   --  101  --   --  101   CO2  --   --   --   --   --  34* 35*  --   --  27  --   --  32*   BUN  --   --   --   --   --  12.6 11.8  --   --  17.6  --   --  17.1   CR  --   --   --   --   --  0.51 0.50*  --   --  0.66  --   --  0.64   ANIONGAP  --   --   --   --   --  4* 3*  --   --  13  --   --  9   MORRO  --   --   --   --   --  7.6* 7.8*  --   --  8.2  --   --  8.8   * 172* 69*   < >  --  111* 90  --    < > 81  --   --  104*   ALBUMIN  --   --   --   --   --  2.7* 2.7*  --   --  3.0*  --    < >  --    PROTTOTAL  --   --   --   --   --   --   --   --   --  6.6 6.4  --   --    BILITOTAL  --   --   --   --   --   --   --   --   --  0.4  --   --   --    ALKPHOS  --   --   --   --   --   --   --   --   --  120*  --   --   --    ALT  --   --   --   --   --   --   --   --   --  10  --   --   --    AST  --   --   --   --   --   --   --   --   --  27  --   --   --     < > = values in this interval not displayed.

## 2022-11-08 NOTE — PROGRESS NOTES
[facility]     Patient has been transitioned to comfort cares, inpatient hospice. Will sign off. Please call with questions/concerns.     Sandra Marrero CNP on 11/8/2022 at 1:41 PM

## 2022-11-08 NOTE — PLAN OF CARE
Goal Outcome Evaluation:                        Problem: End-of-Life Care  Goal: Comfort, Peace and Preserved Dignity  11/8/2022 0514 by Darius Bourgeois RN  Outcome: Not Progressing  11/7/2022 2137 by Darius Bourgeois RN  Outcome: Progressing  Intervention: Promote Physical Comfort  Recent Flowsheet Documentation  Taken 11/7/2022 2134 by Darius Bourgeois, RN  Airway/Ventilation Support: comfort measures provided  Sensory Stimulation Regulation:   auditory stimulation minimized   music on   quiet environment promoted  Environmental Support:   comfort object encouraged   calm environment promoted   caregiver consistency promoted   distractions minimized   environmental consistency promoted   personal routine supported   rest periods encouraged  Intervention: Promote Peace and Maintain Dignity  Recent Flowsheet Documentation  Taken 11/7/2022 2134 by Darius Bourgeois RN  Spiritual Activities Assistance: hope instilled  Supportive Measures:   active listening utilized   counseling provided   decision-making supported   goal-setting facilitated  Complementary Therapy:   music therapy provided   essential oils utilized  Taken 11/7/2022 2130 by Darius Bourgeois RN  Complementary Therapy:   music therapy provided   massage therapy performed  Intervention: Support the Grieving Process  Recent Flowsheet Documentation  Taken 11/7/2022 2134 by Darius Bourgeois RN  Family/Support System Care:   caregiver stress acknowledged   family care conference arranged   involvement promoted  Life Transition/Adjustment: end-of-life care initiated    Comfort cares.  Repositioned Q 2 hrs/PRN.  Incontinent care X1.  Constantly pulling off O2 Oxymask.  Desat's to 70-80's when mask off.  However, sat's return to baseline with re-application of supplemental O2.  Arouses to verbal stimuli.

## 2022-11-09 NOTE — PROGRESS NOTES
"Somnolent and was not responsive today. Son, Eugenio, was updated by NOC shift RN during shift change this am. Spouse updated at bedside while visiting. Offered to call and give update to sons, spouse said he will call them himself. Patient's breathing is agonal. PRN IV Morphine given x 3. Spouse was afraid that more morphine will \"stop her heart\". Writer was respectful of this and educated him that now is the time to keep her as comfortable as possible. Hospice RN following.   "

## 2022-11-09 NOTE — DISCHARGE SUMMARY
Death Summary:    Jennifer Rashid is a 89 year old female with past medical history of recurrent lung cancer, recurrent right pleural effusion requiring thoracocentesis, HTN who presented to ED for evaluation of shortness of breath, found to have large left pleural effusion.  Patient had thoracocentesis x2.  Patient thought to be not a candidate for Pleurx catheter per IR.  Oncology, palliative care team and hospice consulted.  Patient's family decided for hospice.  Patient transition to [facility] hospice and discharge-readmitted to [facility] on 11/8    Patient passed away on 11/9 at 1453.    Non billable    Swati Romo MD  Hospitalist

## 2022-11-09 NOTE — PROGRESS NOTES
Writer found patient not breathing and without a pulse at 1453. Hospitalist has been notified and prounounced her passing. Son, Eugenio, was phoned and notified. He says he will notify the rest of the family. Their plan is to cremate her. Left  for Hospice RN, Gretchen.

## 2022-11-09 NOTE — PROGRESS NOTES
[facility]    Progress Note - [facility] Inpatient [facility]ice    [facility] 24/7 Contact Number: [phone]    - Providers: Please contact [facility] with changes in orders or clinical plan of care   - Nursing: Please contact [facility] with significant changes in patient condition    Plan of Care Discussed with the Following:   - Nurse: Stella RN     - Nurse: Doyle HERNANDES RN    Summary of Visit (includes assessment, medications and any new orders): Patient is oriented x2, denies any pain. Shallow breathing in the 30's, often removes the oxymask. Patient was able to take PO applesauce.    Evening shift: spoke to NORAH HERNANDES Re: patient's O2 stats at night, possible agitation, and removing the oxymask which were apparent last night. Writer and NORAH HERNANDES Spoke to Jennifer's son Eugenio via phone. Eugenio agrees with a proposed POC which includes discontinuing the pulse oximeter readings, keeping the oxymask as close to Jennifer's mouth/nose as possible, and giving morphine/ativan for any s/s of pain, agitation or air hunger.    Plan: As above, medical management of pain, agitation or air hunger. Monitor for s/s of EOL.     Stef Dailey RN BSN  [facility].

## 2022-11-09 NOTE — PLAN OF CARE
Problem: End-of-Life Care  Goal: Comfort, Peace and Preserved Dignity  Outcome: Progressing  Intervention: Promote Physical Comfort  Recent Flowsheet Documentation  Taken 11/8/2022 2000 by Darius Bourgeois RN  Environmental Support:   calm environment promoted   comfort object encouraged   distractions minimized  Intervention: Promote Peace and Maintain Dignity  Recent Flowsheet Documentation  Taken 11/8/2022 2000 by Darius Bourgeois, RN  Spiritual Activities Assistance: hope instilled  Supportive Measures:   active listening utilized   counseling provided   relaxation techniques promoted  Intervention: Support the Grieving Process  Recent Flowsheet Documentation  Taken 11/8/2022 2000 by Darius Bourgeois, RN  Family/Support System Care: caregiver stress acknowledged  Life Transition/Adjustment: end-of-life care initiated   Goal Outcome Evaluation:    Reposition Q 2 hrs/PRN.  No non-verbal expression of pain/discomfort.  Intermittently tachypnic.  Treated with SL 10 mg Morphine for respiration control and possible O2 hunger relief.  See Hospice RN note for more information regarding oxygenation of pt and family desires for comfort care.  Incontinent of urine X1.  Pure wick placed.  Minimal UOP noted in canister.  Continue with comfort cares.

## 2022-11-09 NOTE — PROGRESS NOTES
.  Hospitalist Progress Note    Assessment/Plan  Principal Problem:    Hospice care patient    Jennifer Rashid is a 89 year old female with past medical history of recurrent lung cancer, recurrent right pleural effusion requiring thoracocentesis, HTN who presented to ED for evaluation of shortness of breath, found to have large left pleural effusion.  Patient had thoracocentesis x2.  Patient thought to be not a candidate for Pleurx catheter per IR.  Oncology, palliative care team and hospice consulted.  Patient's family decided for hospice.  Patient transition to [facility] hospice and discharge-readmitted to [facility] on 11/8        Recurrent lung cancer;  Left malignant pleural effusion s/p thoracocentesis x2;  Acute metabolic encephalopathy; multifactorial  Probable pneumonia;  Now on comfort care--order set in place.     Barriers to Discharge:  Actively dying    Anticipated discharge date/Disposition: Expect to pass away here.     Subjective  Patient is new to me. Chart reviewed and events noted.  Patient seen , sleeping and appears comfortable.         Objective    Vital signs in last 24 hours  Temp:  [97  F (36.1  C)] 97  F (36.1  C)  Pulse:  [103-104] 103  Resp:  [11-20] 11  BP: (131)/(65) 131/65  SpO2:  [80 %-100 %] 80 % [unfilled] O2 Device: Oxymask    Weight:   [unfilled] Weight change:     Intake/Output last 3 shifts  I/O last 3 completed shifts:  In: 60 [P.O.:60]  Out: -   There is no height or weight on file to calculate BMI.    Physical Exam    General Appearance:  Sleeping, no distress   Lungs:    Easy respiration           Neurologic:  Somnolent.     Pertinent Labs   Lab Results: personally reviewed.   Recent Labs   Lab 11/05/22 0752 11/04/22  0907    140   CO2 34* 35*   BUN 12.6 11.8   ALBUMIN 2.7* 2.7*     Recent Labs   Lab 11/06/22  0520 11/05/22  0752 11/04/22  0907 11/03/22  0456   WBC 15.4* 24.0*  --  10.7   HGB 9.4* 9.8* 10.3* 11.2*   HCT 30.4* 32.9*  --  37.2   PLT 69* 78*  91* 100*     No results for input(s): CKTOTAL, TROPONINI in the last 168 hours.    Invalid input(s): TROPONINT, CKMBINDEX  Invalid input(s): POCGLUFGR    Medications  Drug and lactation database from the [place] [employer] of Medicine:  http://toxnet.nlm.nih.gov/cgi-bin/sis/htmlgen?LACT      Pertinent Radiology   Radiology Results:  Personally reviewed impressions    Reviewed labs in last 24 hours.    Advanced Care Planning:  Total time with this patient is 25 min with greater than 50% of time spent in coordination of care.  Care discussed and coordinated with SW/CM.    This Note is created using dragon voice recognition software.  Errors in spelling or words which seems out of context are unintentional.  Sounds alike errors may have escaped editing.    Swati Romo MD  Hospitalist

## 2022-11-09 NOTE — CONSULTS
[facility]    Progress Note - [facility]    ______________________________________________________________________    [facility]  Contact Number: [phone]    - Providers: Please contact [facility] with changes in orders or clinical plan of care   - Nursing: Please contact [facility] with significant changes in patient condition  ______________________________________________________________________      Plan of Care Discussed with the Following:   - Nurse: Jaquelin Pineda  - Hospitalist/Rounding Provider:     Jose EDGE MD    Attending    Since 2022    [phone]       - Jennifer's Family/Preferred Contact: Mervin voss  - Hospice Provider: Dr. Andrade Conte    Summary of Visit (includes assessment, medications and any new orders):   Patient was unresponsive to writer. She is wearing face mask and sats are at 75%. There  Is no need for oxygen at this point. Patient has agonal breathing, irregular heart rate, absent pedal pulses, and unresponsive which indicate she is immently dying. Discuss with family plan of care and  arrangements. Noted 4 PRN doses of morphine utilized over past 24 hours.         Gretchen Rocha, RN  [phone]

## 2022-11-10 ASSESSMENT — ACTIVITIES OF DAILY LIVING (ADL)
ADLS_ACUITY_SCORE: 57
ADLS_ACUITY_SCORE: 57

## 2022-11-10 NOTE — PROGRESS NOTES
[phone] We have been waiting for family to sign the contract. I called again to tell them we have no morgue here. So they are going to come get the body within 2 hours. Alka Domingo RN

## 2023-01-12 LAB
ATRIAL RATE - MUSE: 105 BPM
DIASTOLIC BLOOD PRESSURE - MUSE: NORMAL MMHG
INTERPRETATION ECG - MUSE: NORMAL
P AXIS - MUSE: 55 DEGREES
PR INTERVAL - MUSE: 204 MS
QRS DURATION - MUSE: 84 MS
QT - MUSE: 344 MS
QTC - MUSE: 454 MS
R AXIS - MUSE: 21 DEGREES
SYSTOLIC BLOOD PRESSURE - MUSE: NORMAL MMHG
T AXIS - MUSE: 35 DEGREES
VENTRICULAR RATE- MUSE: 105 BPM

## 2025-05-07 NOTE — PROGRESS NOTES
Patient requested refills too soon.   [facility]  Palliative Care Daily Progress Note       Recommendations & Counseling       Goals of care: currently life-prolonging.  Today, Jennifer affirmed she would prefer a focus of treatment more on comfort than life-prolonging treatments.  SHE IS NOT ON COMFORT CARE.     Advanced care planning:HCD found from [employer] system and uploaded to merged Epic.  Names  Salvatore as primary agent, son Eugenio as first alternate.   Eugenio noted yesterday Jennifer told him she wanted him to be primary surrogate.  Jennifer confirmed 11/4 that she wants Eugenio as primary agent.  DNR/DNI and no escalation of cares to ICU.  Recommend POLST prior to discharge (DNR/Comfort).    Support: Spiritual >> Yazidi.   to Salvatore; they recently moved into [facility] ([facility]) and Jennifer is on a wait list for memory care at the facility.  They have four children, one son in town, an intellectually disabled daughter (Gretchen) who resides in a group home, two sons out of town.    Symptoms:  1. Dyspnea due to advanced cancer, pleural effusion, and possible underlying pneumonia.  - Jennifer wishes to try an opioid PRN for air hunger.  I ordered prn morphine (PO should be first line, IV second line).  Would use 3mg oral morphine (could titrate higher but wanted to start low given sarcopenia and frailty and advanced age) and if severe symptoms, starting with 1mg IV morphine.  Again, may need to titrate over weekend.  -  If tolerated, over weekend would consider scheduling q6H but start with PRN first.  - sons are coming from out of town; continue with suppl O2 and continuous pulse ox until their arrival.  - suggest recheck of CXR tomorrow to see if evidence of recurrence of pleural effusion.  If so, consider placement of tunneled pleural catheter.    2. Delirium, concern for underlying cognitive impairment, possibly dementia.  - not agitated but pulling off o2.  - continue with 1:1 for now, attention to  sleep hygiene.  - recommended to Salvatore Becker and family that they discuss possibly discontinuing tethers (telemetry, continuous pulse ox monitoring) because Jennifer does NOT like restraints.  For now, keep all on to lessen chance pt would remove her O2 prior to sons arrival tomorrow to spend time with her--given rapidity of desaturation,   - consider treatment for agitation but currently not agitated.  Being alert/engaging with family important, so avoiding sedating medications for now.        Assessments          Jennifer Rashid is a 89 year old female with R sided non-small cell lung cancer (diagnosed in 2018, follows with [facility]/Dr Gerard and for a time with [facility] but graduated back to Dr Gerard in summer 2022).  She presented with loose stools/diarrhea x 3 days and dyspnea for a week and worsening weakness and found to have delirium, leukocytosis (treating for possible pneumonia) in context of falls and cognitive impairment.    Today, the patient was seen for:  Goals of care conversation, discussion of symptom management.    Prognosis, Goals, or Advance Care Planning was addressed today with: Yes.  Reviewed life expectancy weeks, potentially short months but could be shorter (potential for 2 week life expectancy if she were to take O2 off and leave it off).    Mood, coping, and/or meaning in the context of serious illness were addressed today: Yes.              Interval History:     Chart review/discussion with unit or clinical team members:   Reviewed overnight events, needing mitt restraints as was takingo ff O2 and sats dropped to 70%.   On 1:1.  Discussed danna Monroe of oncology team and nursing.    Per patient or family/caregivers today:  Jennifer notes no significant pain.   Has dyspnea with movement and when supine.  Hates the mitt restraints.  Notes no nausea, appetite fair.  Notes she wishes she could gain weight, has tried to eat as much as she can.    Had conversation with Jennifer, her  son who lives locally and her  Salvatore.  Reviewed functional status impairment significant, and concern she may not be able to continue cancer directed treatment if it doesn't improve--reviewed options of transition from life-prolonging treatment to comfort-focused treatment.  Ree affirmed she would be inclined toward that if no more cancer treatment available to her.  Had telephone conversation and update given to son Eugenio.             Review of Systems:     Besides above, an additional 3 system ROS was reviewed and is unremarkable          Medications:     I have reviewed this patient's medication profile and medications during this hospitalization.    Noted meds:    Current Facility-Administered Medications   Medication     acetaminophen (TYLENOL) tablet 650 mg     alum & mag hydroxide-simethicone (MAALOX) suspension 30 mL     Azelastine HCl SOLN 1 spray     azithromycin (ZITHROMAX) 250 mg in sodium chloride 0.9 % 250 mL intermittent infusion     benzocaine-menthol (CEPACOL) 15-3.6 MG lozenge 1 lozenge     benzonatate (TESSALON) capsule 100 mg     bisacodyl (DULCOLAX) EC tablet 5 mg     bisacodyl (DULCOLAX) suppository 10 mg     cefTRIAXone (ROCEPHIN) 2 g vial to attach to  ml bag for ADULTS or NS 50 ml bag for PEDS     enoxaparin ANTICOAGULANT (LOVENOX) injection 30 mg     escitalopram (LEXAPRO) tablet 5 mg     [START ON 11/5/2022] folic acid (FOLVITE) tablet 400 mcg     guaiFENesin (ROBITUSSIN) 20 mg/mL solution 10 mL     heparin 100 UNIT/ML injection 5-10 mL     heparin lock flush 10 UNIT/ML injection 5-10 mL     heparin lock flush 10 UNIT/ML injection 5-10 mL     ipratropium - albuterol 0.5 mg/2.5 mg/3 mL (DUONEB) neb solution 3 mL     ipratropium - albuterol 0.5 mg/2.5 mg/3 mL (DUONEB) neb solution 3 mL     levothyroxine (SYNTHROID/LEVOTHROID) tablet 88 mcg     magnesium hydroxide (MILK OF MAGNESIA) suspension 30 mL     metoprolol succinate ER (TOPROL XL) 24 hr tablet 25 mg     [Held by  "provider] mirtazapine (REMERON) tablet 15 mg     morphine (PF) injection 1-2 mg     morphine solution 3 mg     senna-docusate (SENOKOT-S/PERICOLACE) 8.6-50 MG per tablet 1 tablet     sodium chloride (NEBUSAL) 3 % neb solution 3 mL     sodium chloride (PF) 0.9% PF flush 10-20 mL     sodium chloride (PF) 0.9% PF flush 10-20 mL     sodium chloride (PF) 0.9% PF flush 10-20 mL   24-hr MME: 0mg             Physical Exam:   Vital Signs: Blood pressure 127/58, pulse 88, temperature 97.8  F (36.6  C), temperature source Oral, resp. rate 18, height 1.626 m (5' 4\"), weight 39.5 kg (87 lb), SpO2 95 %.   GENERAL: alert 89 yo cachectic woman, sitting up in bed, w NC O2 and mitt restraints.  Oriented to place, self, and reason for admission.  At times gets confused but during afternoon conversation, is clear with at least partial capacity.  SKIN: Warm and dry   HEENT: Normocephalic, anicteric sclera, moist mucous membranes.  Marked temporal muscle wasting.    LUNGS: Clear to auscultation anterolaterally; non-labored   CARDIAC: RRR, normal s1/s2, w/o m/r/g   ABDOMINAL: BS (+), soft, non distended, non tender  MUSKL: no gross joint deformities   EXTREMITIES: No edema or cyanosis, pulses 2+ and symmetrical  NEUROLOGIC: alert, moves arms, legs; facial movements symmetric.  PSYCH: affect full, fluent speech.             Data Reviewed:     Reviewed recent pertinent imaging:   TTE 11/3/22:  Interpretation Summary   1. Left ventricular size, wall thickness, and systolic function are normal.   The estimated left ventricular ejection fraction is 60-65%.   2. Right ventricular size and systolic function are normal.   3. Calcified aortic valve with an indeterminate number of leaflets with mild-   to-moderate aortic stenosis. Peak forward velocity measures 2.5 m/s, mean   gradient 15 mm Hg, calculated aortic valve area 1.2 cm2, and dimensionless   index 0.43. Mild aortic regurgitation.   4. Moderate-sized left pleural effusion.   5. A prior " study was performed 4/9/2021. Images are unavailable for   comparison.     Reviewed recent labs:   Last Comprehensive Metabolic Panel:  Sodium   Date Value Ref Range Status   11/02/2022 141 136 - 145 mmol/L Final     Potassium   Date Value Ref Range Status   11/02/2022 4.1 3.4 - 5.3 mmol/L Final   09/24/2021 3.9 3.5 - 5.0 mmol/L Final     Chloride   Date Value Ref Range Status   11/02/2022 101 98 - 107 mmol/L Final   09/24/2021 102 98 - 107 mmol/L Final     Carbon Dioxide (CO2)   Date Value Ref Range Status   11/02/2022 27 22 - 29 mmol/L Final   09/24/2021 28 22 - 31 mmol/L Final     Anion Gap   Date Value Ref Range Status   11/02/2022 13 7 - 15 mmol/L Final   09/24/2021 11 5 - 18 mmol/L Final     Glucose   Date Value Ref Range Status   11/02/2022 81 70 - 99 mg/dL Final   09/24/2021 66 (L) 70 - 125 mg/dL Final     GLUCOSE BY METER POCT   Date Value Ref Range Status   11/02/2022 85 70 - 99 mg/dL Final     Urea Nitrogen   Date Value Ref Range Status   11/02/2022 17.6 8.0 - 23.0 mg/dL Final   09/24/2021 15 8 - 28 mg/dL Final     Creatinine   Date Value Ref Range Status   11/02/2022 0.66 0.51 - 0.95 mg/dL Final     GFR Estimate   Date Value Ref Range Status   11/02/2022 83 >60 mL/min/1.73m2 Final     Comment:     Effective December 21, 2021 eGFRcr in adults is calculated using the 2021 CKD-EPI creatinine equation which includes age and gender (Jada et al., NEJM, DOI: 10.1056/NFULmy9681653)   04/10/2021 >60 >60 mL/min/1.73m2 Final     Calcium   Date Value Ref Range Status   11/02/2022 8.2 8.2 - 9.6 mg/dL Final     CBC RESULTS: Recent Labs   Lab Test 11/03/22  0456   WBC 10.7   RBC 3.22*   HGB 11.2*   HCT 37.2   *   MCH 34.8*   MCHC 30.1*   RDW 18.8*   *           Liza Rai MD  [facility] Palliative Medicine Service: [facility]  Department voice mail (checked M-F 8a-4pm approx): [phone]  [facility] Palliative Medicine pager: [phone]  (Please use AMION for text paging if  possible, use link under Palliative service)  May page me directly via [employer]

## (undated) RX ORDER — LIDOCAINE HYDROCHLORIDE 10 MG/ML
INJECTION, SOLUTION INFILTRATION; PERINEURAL
Status: DISPENSED
Start: 2022-01-01